# Patient Record
Sex: FEMALE | Race: WHITE | NOT HISPANIC OR LATINO | Employment: FULL TIME | URBAN - METROPOLITAN AREA
[De-identification: names, ages, dates, MRNs, and addresses within clinical notes are randomized per-mention and may not be internally consistent; named-entity substitution may affect disease eponyms.]

---

## 2023-04-28 DIAGNOSIS — E55.9 VITAMIN D DEFICIENCY: Primary | ICD-10-CM

## 2023-04-28 RX ORDER — ERGOCALCIFEROL 1.25 MG/1
50000 CAPSULE ORAL
COMMUNITY
Start: 2018-11-20 | End: 2023-04-28 | Stop reason: SDUPTHER

## 2023-04-28 RX ORDER — CLOBETASOL PROPIONATE 0.46 MG/ML
SOLUTION TOPICAL
COMMUNITY
Start: 2022-07-18 | End: 2023-10-17 | Stop reason: ALTCHOICE

## 2023-04-28 RX ORDER — KETOCONAZOLE 20 MG/ML
SHAMPOO, SUSPENSION TOPICAL
COMMUNITY
End: 2023-10-17 | Stop reason: ALTCHOICE

## 2023-04-28 RX ORDER — ATENOLOL 100 MG/1
1 TABLET ORAL NIGHTLY
COMMUNITY
Start: 2018-11-20 | End: 2023-08-07

## 2023-04-28 RX ORDER — BUPROPION HYDROCHLORIDE 150 MG/1
1 TABLET ORAL DAILY
COMMUNITY
Start: 2020-02-24 | End: 2023-10-17 | Stop reason: ALTCHOICE

## 2023-04-28 RX ORDER — TRIAMCINOLONE ACETONIDE 1 MG/G
CREAM TOPICAL
COMMUNITY
Start: 2022-08-24 | End: 2023-10-17 | Stop reason: ALTCHOICE

## 2023-04-28 RX ORDER — SCOLOPAMINE TRANSDERMAL SYSTEM 1 MG/1
1 PATCH, EXTENDED RELEASE TRANSDERMAL
COMMUNITY
Start: 2022-12-07 | End: 2023-08-08 | Stop reason: ALTCHOICE

## 2023-04-28 RX ORDER — VENLAFAXINE HYDROCHLORIDE 75 MG/1
75 CAPSULE, EXTENDED RELEASE ORAL DAILY
COMMUNITY
Start: 2023-04-11 | End: 2023-07-11 | Stop reason: SDUPTHER

## 2023-04-28 RX ORDER — LOSARTAN POTASSIUM 50 MG/1
1 TABLET ORAL DAILY
COMMUNITY
Start: 2018-11-20 | End: 2023-06-01

## 2023-04-28 RX ORDER — ANASTROZOLE 1 MG/1
1 TABLET ORAL DAILY
Qty: 7 TABLET | Refills: 51 | COMMUNITY
Start: 2022-09-06 | End: 2023-08-08 | Stop reason: ALTCHOICE

## 2023-04-28 RX ORDER — MELOXICAM 15 MG/1
1 TABLET ORAL DAILY
COMMUNITY
Start: 2019-09-16 | End: 2023-08-08 | Stop reason: SDUPTHER

## 2023-04-28 RX ORDER — OXYMETAZOLINE HYDROCHLORIDE 1 G/100G
CREAM TOPICAL
COMMUNITY
End: 2023-08-08 | Stop reason: ALTCHOICE

## 2023-05-01 RX ORDER — ERGOCALCIFEROL 1.25 MG/1
50000 CAPSULE ORAL
Qty: 4 CAPSULE | Refills: 3 | Status: SHIPPED | OUTPATIENT
Start: 2023-05-01 | End: 2023-08-08 | Stop reason: SDUPTHER

## 2023-06-01 DIAGNOSIS — I10 PRIMARY HYPERTENSION: Primary | ICD-10-CM

## 2023-06-01 RX ORDER — LOSARTAN POTASSIUM 50 MG/1
TABLET ORAL
Qty: 90 TABLET | Refills: 3 | Status: SHIPPED | OUTPATIENT
Start: 2023-06-01 | End: 2024-05-28

## 2023-07-11 DIAGNOSIS — F33.1 MAJOR DEPRESSIVE DISORDER, RECURRENT EPISODE, MODERATE (MULTI): Primary | ICD-10-CM

## 2023-07-11 RX ORDER — VENLAFAXINE HYDROCHLORIDE 75 MG/1
75 CAPSULE, EXTENDED RELEASE ORAL DAILY
Qty: 90 CAPSULE | Refills: 0 | Status: SHIPPED | OUTPATIENT
Start: 2023-07-11 | End: 2023-08-08 | Stop reason: SDUPTHER

## 2023-08-07 DIAGNOSIS — I10 PRIMARY HYPERTENSION: Primary | ICD-10-CM

## 2023-08-07 RX ORDER — ATENOLOL 100 MG/1
100 TABLET ORAL NIGHTLY
Qty: 90 TABLET | Refills: 3 | Status: SHIPPED | OUTPATIENT
Start: 2023-08-07 | End: 2023-08-08 | Stop reason: SDUPTHER

## 2023-08-08 ENCOUNTER — OFFICE VISIT (OUTPATIENT)
Dept: PRIMARY CARE | Facility: CLINIC | Age: 58
End: 2023-08-08
Payer: COMMERCIAL

## 2023-08-08 VITALS
WEIGHT: 293 LBS | DIASTOLIC BLOOD PRESSURE: 86 MMHG | SYSTOLIC BLOOD PRESSURE: 130 MMHG | HEART RATE: 60 BPM | TEMPERATURE: 98 F | BODY MASS INDEX: 53.42 KG/M2

## 2023-08-08 DIAGNOSIS — I10 PRIMARY HYPERTENSION: ICD-10-CM

## 2023-08-08 DIAGNOSIS — E55.9 VITAMIN D DEFICIENCY: ICD-10-CM

## 2023-08-08 DIAGNOSIS — M15.9 PRIMARY OSTEOARTHRITIS INVOLVING MULTIPLE JOINTS: Primary | ICD-10-CM

## 2023-08-08 DIAGNOSIS — E66.01 CLASS 3 SEVERE OBESITY DUE TO EXCESS CALORIES WITHOUT SERIOUS COMORBIDITY WITH BODY MASS INDEX (BMI) OF 50.0 TO 59.9 IN ADULT (MULTI): ICD-10-CM

## 2023-08-08 DIAGNOSIS — F33.1 MAJOR DEPRESSIVE DISORDER, RECURRENT EPISODE, MODERATE (MULTI): ICD-10-CM

## 2023-08-08 DIAGNOSIS — C50.211 MALIGNANT NEOPLASM OF UPPER-INNER QUADRANT OF RIGHT FEMALE BREAST, UNSPECIFIED ESTROGEN RECEPTOR STATUS (MULTI): ICD-10-CM

## 2023-08-08 PROBLEM — F32.5 DEPRESSION, MAJOR, IN REMISSION (CMS-HCC): Status: ACTIVE | Noted: 2023-08-08

## 2023-08-08 PROBLEM — R09.A2 GLOBUS SENSATION: Status: ACTIVE | Noted: 2023-08-08

## 2023-08-08 PROBLEM — M19.90 OSTEOARTHRITIS: Status: ACTIVE | Noted: 2023-08-08

## 2023-08-08 PROBLEM — R13.10 DYSPHAGIA: Status: ACTIVE | Noted: 2023-08-08

## 2023-08-08 PROBLEM — G47.19 EXCESSIVE DAYTIME SLEEPINESS: Status: ACTIVE | Noted: 2023-08-08

## 2023-08-08 PROBLEM — G47.00 INSOMNIA: Status: ACTIVE | Noted: 2023-08-08

## 2023-08-08 PROBLEM — N20.0 RIGHT NEPHROLITHIASIS: Status: ACTIVE | Noted: 2023-08-08

## 2023-08-08 PROBLEM — E78.5 HYPERLIPIDEMIA: Status: ACTIVE | Noted: 2023-08-08

## 2023-08-08 PROBLEM — E66.813 CLASS 3 SEVERE OBESITY DUE TO EXCESS CALORIES WITHOUT SERIOUS COMORBIDITY WITH BODY MASS INDEX (BMI) OF 50.0 TO 59.9 IN ADULT: Status: ACTIVE | Noted: 2023-08-08

## 2023-08-08 PROCEDURE — 3079F DIAST BP 80-89 MM HG: CPT | Performed by: FAMILY MEDICINE

## 2023-08-08 PROCEDURE — 3075F SYST BP GE 130 - 139MM HG: CPT | Performed by: FAMILY MEDICINE

## 2023-08-08 PROCEDURE — 99214 OFFICE O/P EST MOD 30 MIN: CPT | Performed by: FAMILY MEDICINE

## 2023-08-08 PROCEDURE — 1036F TOBACCO NON-USER: CPT | Performed by: FAMILY MEDICINE

## 2023-08-08 PROCEDURE — 3008F BODY MASS INDEX DOCD: CPT | Performed by: FAMILY MEDICINE

## 2023-08-08 RX ORDER — ATENOLOL 100 MG/1
100 TABLET ORAL NIGHTLY
Qty: 90 TABLET | Refills: 3 | Status: SHIPPED | OUTPATIENT
Start: 2023-08-08

## 2023-08-08 RX ORDER — ALENDRONATE SODIUM 70 MG/1
70 TABLET ORAL
COMMUNITY
Start: 2023-07-19 | End: 2023-11-06 | Stop reason: SDUPTHER

## 2023-08-08 RX ORDER — MELOXICAM 15 MG/1
15 TABLET ORAL DAILY
Qty: 90 TABLET | Refills: 3 | Status: SHIPPED | OUTPATIENT
Start: 2023-08-08

## 2023-08-08 RX ORDER — CALCIUM CARBONATE 300MG(750)
400 TABLET,CHEWABLE ORAL DAILY
COMMUNITY

## 2023-08-08 RX ORDER — ERGOCALCIFEROL 1.25 MG/1
50000 CAPSULE ORAL
Qty: 4 CAPSULE | Refills: 3 | Status: SHIPPED | OUTPATIENT
Start: 2023-08-08 | End: 2023-08-09 | Stop reason: SDUPTHER

## 2023-08-08 RX ORDER — VENLAFAXINE HYDROCHLORIDE 75 MG/1
75 CAPSULE, EXTENDED RELEASE ORAL DAILY
Qty: 90 CAPSULE | Refills: 0 | Status: SHIPPED | OUTPATIENT
Start: 2023-08-08 | End: 2024-01-03 | Stop reason: SDUPTHER

## 2023-08-08 RX ORDER — MELATONIN 5 MG
CAPSULE ORAL
COMMUNITY

## 2023-08-08 RX ORDER — FERROUS SULFATE, DRIED 160(50) MG
1 TABLET, EXTENDED RELEASE ORAL DAILY
COMMUNITY

## 2023-08-08 RX ORDER — TAMOXIFEN CITRATE 20 MG/1
20 TABLET ORAL DAILY
COMMUNITY
Start: 2023-07-27

## 2023-08-08 NOTE — PROGRESS NOTES
Subjective   Melissa Bardales is a 58 y.o. female who presents for Follow-up (6mon fu).    Doing well.  Has a new grandchild.  Trying to  watch her weight.  No complaints         Review of Systems   Constitutional:  Negative for fever.   Respiratory:  Negative for shortness of breath.    Cardiovascular:  Negative for chest pain.   Gastrointestinal:  Negative for nausea and vomiting.   Neurological:  Negative for dizziness and light-headedness.   All other systems reviewed and are negative.      Objective   /86   Pulse 60   Temp 36.7 °C (98 °F)   Wt (!) 150 kg (331 lb)   BMI 53.42 kg/m²     Physical Exam  HENT:      Head: Normocephalic and atraumatic.      Mouth/Throat:      Mouth: Mucous membranes are moist.      Pharynx: Oropharynx is clear.   Eyes:      Extraocular Movements: Extraocular movements intact.      Pupils: Pupils are equal, round, and reactive to light.   Cardiovascular:      Rate and Rhythm: Normal rate and regular rhythm.      Heart sounds: Normal heart sounds.   Pulmonary:      Effort: Pulmonary effort is normal.      Breath sounds: Normal breath sounds.   Musculoskeletal:         General: Normal range of motion.      Cervical back: Normal range of motion.   Lymphadenopathy:      Cervical: No cervical adenopathy.   Skin:     General: Skin is warm and dry.   Neurological:      General: No focal deficit present.      Mental Status: She is alert.   Psychiatric:         Mood and Affect: Mood normal.         Assessment/Plan   Problem List Items Addressed This Visit       Hypertension    Relevant Medications    atenolol (Tenormin) 100 mg tablet    Other Relevant Orders    Follow Up In [facility] - PCP - Health Maintenance    Malignant neoplasm of upper-inner quadrant of right female breast (CMS/HCC)    Osteoarthritis - Primary    Relevant Medications    meloxicam (Mobic) 15 mg tablet    Vitamin D deficiency    Class 3 severe obesity due to excess calories without serious comorbidity  with body mass index (BMI) of 50.0 to 59.9 in adult (CMS/[facility])     Consider NOOM  Discussed healthy diet, including eating whole, non-processed foods, increasing vegetable, fruits and whole grains. Eat small amounts throughout the day and reduce portion size.  Avoid all  calorie containing beverages and drink more water.  Exercise at least 30-60 minutes daily.  Choose something you enjoy so you can stick with it. Be sure to have a regular sleep routine.  Stick with your new lifestyle changes even if you fail.  Don't give up!             Other Visit Diagnoses       Major depressive disorder, recurrent episode, moderate (CMS/[facility])        Relevant Medications    venlafaxine XR (Effexor-XR) 75 mg 24 hr capsule              There are no Patient Instructions on file for this visit.

## 2023-08-09 DIAGNOSIS — E55.9 VITAMIN D DEFICIENCY: ICD-10-CM

## 2023-08-10 RX ORDER — ERGOCALCIFEROL 1.25 MG/1
50000 CAPSULE ORAL
Qty: 13 CAPSULE | Refills: 1 | Status: SHIPPED | OUTPATIENT
Start: 2023-08-10 | End: 2023-10-17 | Stop reason: SDUPTHER

## 2023-08-11 ASSESSMENT — ENCOUNTER SYMPTOMS
FEVER: 0
LIGHT-HEADEDNESS: 0
VOMITING: 0
SHORTNESS OF BREATH: 0
DIZZINESS: 0
NAUSEA: 0

## 2023-08-11 NOTE — ASSESSMENT & PLAN NOTE
Consider NOOM  Discussed healthy diet, including eating whole, non-processed foods, increasing vegetable, fruits and whole grains. Eat small amounts throughout the day and reduce portion size.  Avoid all  calorie containing beverages and drink more water.  Exercise at least 30-60 minutes daily.  Choose something you enjoy so you can stick with it. Be sure to have a regular sleep routine.  Stick with your new lifestyle changes even if you fail.  Don't give up!

## 2023-08-26 PROBLEM — H52.223 REGULAR ASTIGMATISM OF BOTH EYES: Status: ACTIVE | Noted: 2020-08-21

## 2023-08-26 PROBLEM — R92.8 ABNORMAL MAMMOGRAM: Status: ACTIVE | Noted: 2023-08-26

## 2023-08-26 PROBLEM — M25.562 PAIN IN BOTH KNEES: Status: ACTIVE | Noted: 2023-08-26

## 2023-08-26 PROBLEM — M25.561 PAIN IN BOTH KNEES: Status: ACTIVE | Noted: 2023-08-26

## 2023-08-26 PROBLEM — M17.0 OSTEOARTHRITIS OF BOTH KNEES: Status: ACTIVE | Noted: 2023-08-26

## 2023-08-26 PROBLEM — H60.63 CHRONIC OTITIS EXTERNA OF BOTH EARS: Status: ACTIVE | Noted: 2023-08-26

## 2023-08-26 RX ORDER — ERGOCALCIFEROL 1.25 MG/1
1.25 CAPSULE ORAL
COMMUNITY
Start: 2022-02-12 | End: 2024-04-11

## 2023-08-26 RX ORDER — ANASTROZOLE 1 MG/1
1 TABLET ORAL
COMMUNITY
Start: 2022-02-16 | End: 2023-10-17 | Stop reason: SINTOL

## 2023-08-26 RX ORDER — BUPROPION HYDROCHLORIDE 300 MG/1
300 TABLET ORAL DAILY
COMMUNITY
Start: 2022-03-25 | End: 2023-10-17 | Stop reason: ALTCHOICE

## 2023-10-02 ASSESSMENT — PATIENT HEALTH QUESTIONNAIRE - PHQ9
5. POOR APPETITE OR OVEREATING: NOT AT ALL
10. IF YOU CHECKED OFF ANY PROBLEMS, HOW DIFFICULT HAVE THESE PROBLEMS MADE IT FOR YOU TO DO YOUR WORK, TAKE CARE OF THINGS AT HOME, OR GET ALONG WITH OTHER PEOPLE: NOT DIFFICULT AT ALL
4. FEELING TIRED OR HAVING LITTLE ENERGY: SEVERAL DAYS
4. FEELING TIRED OR HAVING LITTLE ENERGY: 1
7. TROUBLE CONCENTRATING ON THINGS, SUCH AS READING THE NEWSPAPER OR WATCHING TELEVISION: 0
3. TROUBLE FALLING OR STAYING ASLEEP OR SLEEPING TOO MUCH: 0
3. TROUBLE FALLING OR STAYING ASLEEP OR SLEEPING TOO MUCH: NOT AT ALL
10. IF YOU CHECKED OFF ANY PROBLEMS, HOW DIFFICULT HAVE THESE PROBLEMS MADE IT FOR YOU TO DO YOUR WORK, TAKE CARE OF THINGS AT HOME, OR GET ALONG WITH OTHER PEOPLE: NOT DIFFICULT AT ALL
2. FEELING DOWN, DEPRESSED, IRRITABLE, OR HOPELESS: 0
4. FEELING TIRED OR HAVING LITTLE ENERGY: SEVERAL DAYS
5. POOR APPETITE OR OVEREATING: 0
6. FEELING BAD ABOUT YOURSELF - OR THAT YOU ARE A FAILURE OR HAVE LET YOURSELF OR YOUR FAMILY DOWN: NOT AT ALL
8. MOVING OR SPEAKING SO SLOWLY THAT OTHER PEOPLE COULD HAVE NOTICED. OR THE OPPOSITE, BEING SO FIGETY OR RESTLESS THAT YOU HAVE BEEN MOVING AROUND A LOT MORE THAN USUAL: 0
1. LITTLE INTEREST OR PLEASURE IN DOING THINGS: NOT AT ALL
8. MOVING OR SPEAKING SO SLOWLY THAT OTHER PEOPLE COULD HAVE NOTICED. OR THE OPPOSITE, BEING SO FIGETY OR RESTLESS THAT YOU HAVE BEEN MOVING AROUND A LOT MORE THAN USUAL: NOT AT ALL
7. TROUBLE CONCENTRATING ON THINGS, SUCH AS READING THE NEWSPAPER OR WATCHING TELEVISION: NOT AT ALL
6. FEELING BAD ABOUT YOURSELF - OR THAT YOU ARE A FAILURE OR HAVE LET YOURSELF OR YOUR FAMILY DOWN: NOT AT ALL
2. FEELING DOWN, DEPRESSED OR HOPELESS: NOT AT ALL
5. POOR APPETITE OR OVEREATING: NOT AT ALL
1. LITTLE INTEREST OR PLEASURE IN DOING THINGS: 0
8. MOVING OR SPEAKING SO SLOWLY THAT OTHER PEOPLE COULD HAVE NOTICED. OR THE OPPOSITE, BEING SO FIGETY OR RESTLESS THAT YOU HAVE BEEN MOVING AROUND A LOT MORE THAN USUAL: NOT AT ALL
9. THOUGHTS THAT YOU WOULD BE BETTER OFF DEAD, OR OF HURTING YOURSELF: NOT AT ALL
7. TROUBLE CONCENTRATING ON THINGS, SUCH AS READING THE NEWSPAPER OR WATCHING TELEVISION: NOT AT ALL
SUM OF ALL RESPONSES TO PHQ QUESTIONS 1-9: 1
9. THOUGHTS THAT YOU WOULD BE BETTER OFF DEAD, OR OF HURTING YOURSELF: NOT AT ALL
3. TROUBLE FALLING OR STAYING ASLEEP OR SLEEPING TOO MUCH: NOT AT ALL
9. THOUGHTS THAT YOU WOULD BE BETTER OFF DEAD, OR OF HURTING YOURSELF: 0
SUM OF ALL RESPONSES TO PHQ QUESTIONS 1-9: 1
1. LITTLE INTEREST OR PLEASURE IN DOING THINGS: NOT AT ALL
6. FEELING BAD ABOUT YOURSELF - OR THAT YOU ARE A FAILURE OR HAVE LET YOURSELF OR YOUR FAMILY DOWN: 0
2. FEELING DOWN, DEPRESSED, IRRITABLE, OR HOPELESS: NOT AT ALL

## 2023-10-04 ENCOUNTER — TELEPHONE (OUTPATIENT)
Dept: HEMATOLOGY/ONCOLOGY | Facility: CLINIC | Age: 58
End: 2023-10-04

## 2023-10-04 ENCOUNTER — LAB (OUTPATIENT)
Dept: LAB | Facility: CLINIC | Age: 58
End: 2023-10-04
Payer: COMMERCIAL

## 2023-10-04 ENCOUNTER — OFFICE VISIT (OUTPATIENT)
Dept: HEMATOLOGY/ONCOLOGY | Facility: CLINIC | Age: 58
End: 2023-10-04
Payer: COMMERCIAL

## 2023-10-04 ENCOUNTER — DOCUMENTATION (OUTPATIENT)
Dept: HEMATOLOGY/ONCOLOGY | Facility: CLINIC | Age: 58
End: 2023-10-04

## 2023-10-04 VITALS
HEART RATE: 69 BPM | HEIGHT: 65 IN | SYSTOLIC BLOOD PRESSURE: 134 MMHG | BODY MASS INDEX: 48.82 KG/M2 | RESPIRATION RATE: 16 BRPM | TEMPERATURE: 97.2 F | OXYGEN SATURATION: 96 % | DIASTOLIC BLOOD PRESSURE: 81 MMHG | WEIGHT: 293 LBS

## 2023-10-04 DIAGNOSIS — C50.211 MALIGNANT NEOPLASM OF UPPER-INNER QUADRANT OF RIGHT BREAST IN FEMALE, ESTROGEN RECEPTOR POSITIVE (MULTI): ICD-10-CM

## 2023-10-04 DIAGNOSIS — Z17.0 MALIGNANT NEOPLASM OF UPPER-INNER QUADRANT OF RIGHT BREAST IN FEMALE, ESTROGEN RECEPTOR POSITIVE (MULTI): ICD-10-CM

## 2023-10-04 DIAGNOSIS — C50.211 MALIGNANT NEOPLASM OF UPPER-INNER QUADRANT OF RIGHT BREAST IN FEMALE, ESTROGEN RECEPTOR POSITIVE (MULTI): Primary | ICD-10-CM

## 2023-10-04 DIAGNOSIS — Z17.0 MALIGNANT NEOPLASM OF UPPER-INNER QUADRANT OF RIGHT BREAST IN FEMALE, ESTROGEN RECEPTOR POSITIVE (MULTI): Primary | ICD-10-CM

## 2023-10-04 LAB
ALBUMIN SERPL BCP-MCNC: 3.5 G/DL (ref 3.4–5)
ALP SERPL-CCNC: 70 U/L (ref 33–110)
ALT SERPL W P-5'-P-CCNC: 87 U/L (ref 7–45)
ANION GAP SERPL CALC-SCNC: 8 MMOL/L (ref 10–20)
AST SERPL W P-5'-P-CCNC: 59 U/L (ref 9–39)
BILIRUB SERPL-MCNC: 0.5 MG/DL (ref 0–1.2)
BUN SERPL-MCNC: 15 MG/DL (ref 6–23)
CALCIUM SERPL-MCNC: 8.8 MG/DL (ref 8.6–10.3)
CHLORIDE SERPL-SCNC: 106 MMOL/L (ref 98–107)
CO2 SERPL-SCNC: 29 MMOL/L (ref 21–32)
CREAT SERPL-MCNC: 0.64 MG/DL (ref 0.5–1.05)
GFR SERPL CREATININE-BSD FRML MDRD: >90 ML/MIN/1.73M*2
GLUCOSE SERPL-MCNC: 112 MG/DL (ref 74–99)
POTASSIUM SERPL-SCNC: 4.1 MMOL/L (ref 3.5–5.3)
PROT SERPL-MCNC: 6.2 G/DL (ref 6.4–8.2)
SODIUM SERPL-SCNC: 139 MMOL/L (ref 136–145)

## 2023-10-04 PROCEDURE — 3008F BODY MASS INDEX DOCD: CPT | Performed by: INTERNAL MEDICINE

## 2023-10-04 PROCEDURE — 1036F TOBACCO NON-USER: CPT | Performed by: INTERNAL MEDICINE

## 2023-10-04 PROCEDURE — 36415 COLL VENOUS BLD VENIPUNCTURE: CPT

## 2023-10-04 PROCEDURE — 99214 OFFICE O/P EST MOD 30 MIN: CPT | Performed by: INTERNAL MEDICINE

## 2023-10-04 PROCEDURE — 3075F SYST BP GE 130 - 139MM HG: CPT | Performed by: INTERNAL MEDICINE

## 2023-10-04 PROCEDURE — 3079F DIAST BP 80-89 MM HG: CPT | Performed by: INTERNAL MEDICINE

## 2023-10-04 PROCEDURE — 80053 COMPREHEN METABOLIC PANEL: CPT

## 2023-10-04 ASSESSMENT — PAIN SCALES - GENERAL: PAINLEVEL: 0-NO PAIN

## 2023-10-04 NOTE — PROGRESS NOTES
Patient ID: Melissa Bardales is a 58 y.o. female.  Subjective    HPI  Ms. Melissa Bardales is a 57 y/o F who presents for follow-up for early breast cancer.    Since last visit, patient reports that she is tolerating the tamoxifen well with good appetite and improved energy. States she has started the alendronate. Denies any new chest pain, palpitations, cough or SOB. States she had a few days of mild nausea but denies any stool changes or other GI issues. No new abdominal pain. She also reports a 2-week history of new pain in her right thigh, however believes it is musculoskeletal. No new bone pain. No other complaints today. Patient sees Dr. Manzano with mammogram in January 2024.       PMHx: HTN, depression (ongoing for years, restarted Wellbutrin in 2020), kidney stones in 01/2021     PSHx: bilateral knee surgery, partial right mastectomy (1/20/2021)     FHx: No other specific history of bleeding, clotting or malignant disorder in the family.      Social Hx: Non-smoker, minimal EtOH use.  with 3 healthy children. Daughter is a pharmacist. Currently working.      Objective    Vitals:    10/04/23 1245   BP: 134/81   Pulse: 69   Resp: 16   Temp: 36.2 °C (97.2 °F)   SpO2: 96%     Review of Systems:   Review of Systems:    Positive per HPI, otherwise negative.     Physical Exam:      Constitutional: Patient appears in no acute distress.   Sitting comfortably in chair.   Eyes: EOMI, clear sclera   ENMT: mucous membranes moist, no apparent injury   Head/Neck: Neck supple, no JVD   Respiratory/Thorax: Patent airways, CTAB, normal  breath sounds, no increased work of breathing   Cardiovascular: Regular, rate and rhythm, no murmurs   Gastrointestinal: Nondistended, soft, non-tender,  no rebound tenderness or guarding, no masses palpable   Extremities: normal extremities, no cyanosis edema,  no swelling   Neurological: alert and oriented x3, nonfocal, normal  speech and hearing   Breast: deferred today    Lymphatic: No palpable lymphadenopathy in cervical,  axillary  lymph nodes.  Spleen appears normal size.   Psychological: Appropriate mood and behavior, normal  affect   Skin: Warm and dry, no lesions, no rashes     Performance Status:  Symptomatic; fully ambulatory    Assessment/Plan    1. Stage IA right breast IDC, [id number] (1.6 cm) ER 95,AZ 95, HER2 negative, low risk luminal type A  - Initially diagnosed after screening mammogram. Patient is now s/p right partial mastectomy.  - We discussed her final pathology, low risk luminal type A, and given no negative small tumor, will proceed with adjuvant endocrine therapy after radiation therapy.  - Discussed data on risk of recurrence, and given young age, I do recommend endocrine therapy, although she is concerned for side effects.  - Discussed risk of bone loss and advise to start calcium and vitamin D.  - Also discussed long term risk of cardiac disease and discussed importance of maintaining a healthy BMI and following up for regular age appropriate cardiovascular health such as good blood pressure control and assessing for HLD.  - Started letrozole 2.5 mg pO 3/31/2021.  - Patient switched to Arimidex (6/10/2021) due to possible dizziness from letrozole.  - No evidence of recurrence on exam today.   - Overall, she continues to tolerate Arimidex fine     3/3/23:  - Patient reports no new toxicities.   - We did discuss Zometa as an adjuvant treatment to help prevent bone loss and recurrence of breast cancer.  - She will think about it. Information provided today.   - She will continue anastrozole for now. Will continue calcium and vitamin D.  - RTC in 4 months with David, 8 months with me, and she will see Dr. Manzano in 12/2023 for her mammogram.     7/3/23:  - Patient without evidence of recurrence on today's exam  - She is tolerating Anastrazole well  - Does not want to do Zometa at this time, due for repeat DEXA  - Mammogram due in December with Dr. Manzano  -  She will return in October to see Dr. Rhonda Quiroz   - At least 20 minutes of direct consultation was spent with the patient today  - RTC in 3 months      7/19/23: Telephone call   - Reviewed bone density which shows worsening bone loss now consistent with osteopenia completed in June 2023 and after reviewing these results patient is hesitant to continue on aromatase inhibitors.   - We discussed the risk of progression and the recommendation to continue for 5 years in the postmenopausal state. She wished to discuss alternatives. We discussed tamoxifen which is thought to be inferior to aromatase inhibitors in postmenopausal that  is associated with less bone loss. She would like to try this option and we will send in prescription.   - She also is requesting alendronate. We discussed this is for osteoporosis not osteopenia but we can attempt to see if insurance will cover. We will plan for alendronate 70 mg weekly along with calcium.  - RTC as previously scheduled in 3 months.    10/4/23:  - Patient overall is feeling well.   - No major toxicities.   - She does describe some new pain in her right thigh that started 2 weeks ago and believes it is muskuloskeletal.  - She did have some elevated liver enzymes in June. We will plan to repeat a CMP today.  - We will just follow her enzymes and if still elevated we will consider further imaging with an ultrasound versus seeing her PCP.   - Given she is 2.5 years out we will stretch her follow-ups to 6 months.  - She is planned to get a mammogram and see Dr. Manzano in January 2024.   - We will see her again in July 2024.   - RTC in July 2024.      2. Depression  - On Wellbutrin for the last  year.  - Will monitor while on Letrozole.   - Doing ok.     3. Hypertension   - Management per PCP.     4. Vertigo  - Appears benign positional. Occurs daily. No recent viral or other provoking factors. Patient to try Epley maneuvers and follow up in 2 weeks.        RTC in July 2024. This  note has been transcribed using a medical scribe and there is a possibility of unintentional typing misprints.      Diagnoses and all orders for this visit:  Malignant neoplasm of upper-inner quadrant of right breast in female, estrogen receptor positive (CMS/HCC)     Rhonda Quiroz MD  Hematology/Oncology  [facility]    Scribe Attestation  By signing my name below, AMANDA TasneemAj Guoibcarri attest that this documentation has been prepared under the direction and in the presence of Rhonda Quiroz MD.

## 2023-10-04 NOTE — TELEPHONE ENCOUNTER
Per Dr. Quiroz liver enzymes still up just slight and we do recommend follow up with PCP      I told Melissa all information. She did let me know that she already reached out to her PCP regarding next steps. I encouraged her to call back if she needs anything. She was very appreciative and denied further questions

## 2023-10-17 ENCOUNTER — OFFICE VISIT (OUTPATIENT)
Dept: PRIMARY CARE | Facility: CLINIC | Age: 58
End: 2023-10-17
Payer: COMMERCIAL

## 2023-10-17 ENCOUNTER — LAB (OUTPATIENT)
Dept: LAB | Facility: LAB | Age: 58
End: 2023-10-17
Payer: COMMERCIAL

## 2023-10-17 VITALS
WEIGHT: 293 LBS | DIASTOLIC BLOOD PRESSURE: 74 MMHG | TEMPERATURE: 98.2 F | HEART RATE: 69 BPM | SYSTOLIC BLOOD PRESSURE: 144 MMHG | BODY MASS INDEX: 55.98 KG/M2

## 2023-10-17 DIAGNOSIS — G47.33 OSA (OBSTRUCTIVE SLEEP APNEA): ICD-10-CM

## 2023-10-17 DIAGNOSIS — Z00.00 WELL ADULT HEALTH CHECK: ICD-10-CM

## 2023-10-17 DIAGNOSIS — R74.8 ELEVATED LIVER ENZYMES: ICD-10-CM

## 2023-10-17 DIAGNOSIS — R74.8 ELEVATED LIVER ENZYMES: Primary | ICD-10-CM

## 2023-10-17 DIAGNOSIS — E66.01 CLASS 3 SEVERE OBESITY DUE TO EXCESS CALORIES WITHOUT SERIOUS COMORBIDITY WITH BODY MASS INDEX (BMI) OF 50.0 TO 59.9 IN ADULT (MULTI): ICD-10-CM

## 2023-10-17 PROBLEM — R09.A2 GLOBUS SENSATION: Status: RESOLVED | Noted: 2023-08-08 | Resolved: 2023-10-17

## 2023-10-17 PROBLEM — H60.63 CHRONIC OTITIS EXTERNA OF BOTH EARS: Status: RESOLVED | Noted: 2023-08-26 | Resolved: 2023-10-17

## 2023-10-17 PROBLEM — M25.562 PAIN IN BOTH KNEES: Status: RESOLVED | Noted: 2023-08-26 | Resolved: 2023-10-17

## 2023-10-17 PROBLEM — M25.561 PAIN IN BOTH KNEES: Status: RESOLVED | Noted: 2023-08-26 | Resolved: 2023-10-17

## 2023-10-17 PROCEDURE — 80061 LIPID PANEL: CPT

## 2023-10-17 PROCEDURE — 86663 EPSTEIN-BARR ANTIBODY: CPT

## 2023-10-17 PROCEDURE — 86664 EPSTEIN-BARR NUCLEAR ANTIGEN: CPT

## 2023-10-17 PROCEDURE — 3077F SYST BP >= 140 MM HG: CPT | Performed by: FAMILY MEDICINE

## 2023-10-17 PROCEDURE — 3008F BODY MASS INDEX DOCD: CPT | Performed by: FAMILY MEDICINE

## 2023-10-17 PROCEDURE — 3078F DIAST BP <80 MM HG: CPT | Performed by: FAMILY MEDICINE

## 2023-10-17 PROCEDURE — 36415 COLL VENOUS BLD VENIPUNCTURE: CPT

## 2023-10-17 PROCEDURE — 99214 OFFICE O/P EST MOD 30 MIN: CPT | Performed by: FAMILY MEDICINE

## 2023-10-17 PROCEDURE — 80074 ACUTE HEPATITIS PANEL: CPT

## 2023-10-17 PROCEDURE — 89240 UNLISTED MISC PATH TEST: CPT | Performed by: FAMILY MEDICINE

## 2023-10-17 PROCEDURE — 86665 EPSTEIN-BARR CAPSID VCA: CPT

## 2023-10-17 PROCEDURE — 1036F TOBACCO NON-USER: CPT | Performed by: FAMILY MEDICINE

## 2023-10-17 PROCEDURE — 80053 COMPREHEN METABOLIC PANEL: CPT

## 2023-10-17 NOTE — PROGRESS NOTES
Subjective   Melissa Bardales is a 58 y.o. female who presents for Follow-up (Fu to labs she had done with Endo - liver enzymes).    Has been seeing Dr. Quiroz oncology to follow up breast cancer.  Did not tolerate several of the agents.  She was changed to tamoxifen in July.    Around that same time she was found to have elevated lft's which went up further on recheck.   She denies alcohol, tylenol, no risk factors for viral hepatitis.    She denies abdominal pain.      Never really got the results from her HST in March.  Thought she heard she needed CPAP but never did it.    C/o poor sleep, snoring.  Not restful sleep, daytime fatigue.                 Review of Systems   Constitutional:  Negative for fever.   Respiratory:  Negative for shortness of breath.    Cardiovascular:  Negative for chest pain.   Gastrointestinal:  Negative for nausea and vomiting.   Neurological:  Negative for dizziness and light-headedness.   All other systems reviewed and are negative.      Objective   /74   Pulse 69   Temp 36.8 °C (98.2 °F)   Wt (!) 152 kg (336 lb)   BMI 55.98 kg/m²     Physical Exam  HENT:      Head: Normocephalic and atraumatic.      Mouth/Throat:      Mouth: Mucous membranes are moist.      Pharynx: Oropharynx is clear.   Eyes:      Extraocular Movements: Extraocular movements intact.      Pupils: Pupils are equal, round, and reactive to light.   Cardiovascular:      Rate and Rhythm: Normal rate and regular rhythm.      Heart sounds: Normal heart sounds.   Pulmonary:      Effort: Pulmonary effort is normal.      Breath sounds: Normal breath sounds.   Musculoskeletal:         General: Normal range of motion.      Cervical back: Normal range of motion.   Lymphadenopathy:      Cervical: No cervical adenopathy.   Skin:     General: Skin is warm and dry.   Neurological:      General: No focal deficit present.      Mental Status: She is alert.   Psychiatric:         Mood and Affect: Mood normal.          Assessment/Plan   Problem List Items Addressed This Visit       Class 3 severe obesity due to excess calories without serious comorbidity with body mass index (BMI) of 50.0 to 59.9 in adult (CMS/HCC)     Discussed healthy diet, including eating whole, non-processed foods, increasing vegetable, fruits and whole grains. Eat small amounts throughout the day and reduce portion size.  Avoid all  calorie containing beverages and drink more water.  Exercise at least 30-60 minutes daily.  Choose something you enjoy so you can stick with it. Be sure to have a regular sleep routine.  Stick with your new lifestyle changes even if you fail.  Don't give up!            Elevated liver enzymes - Primary    Relevant Orders    US abdomen limited liver    Hepatitis panel, acute (Completed)    Sumit-Barr virus VCA antibody panel (Completed)    Comprehensive metabolic panel (Completed)    ELIZABETH (obstructive sleep apnea)     We reviewed her HST in detail.  She does not want to go to the lab for further testing.  Would just like to try the APAP.    I ordered it for her.  We talked about sleep hygiene.           Relevant Orders    Positive Airway Pressure (PAP) Therapy     Other Visit Diagnoses       Well adult health check        Relevant Orders    Lipid Panel (Completed)              There are no Patient Instructions on file for this visit.

## 2023-10-18 LAB
ALBUMIN SERPL BCP-MCNC: 3.9 G/DL (ref 3.4–5)
ALP SERPL-CCNC: 75 U/L (ref 33–110)
ALT SERPL W P-5'-P-CCNC: 80 U/L (ref 7–45)
ANION GAP SERPL CALC-SCNC: 18 MMOL/L (ref 10–20)
AST SERPL W P-5'-P-CCNC: 82 U/L (ref 9–39)
BILIRUB SERPL-MCNC: 0.6 MG/DL (ref 0–1.2)
BUN SERPL-MCNC: 14 MG/DL (ref 6–23)
CALCIUM SERPL-MCNC: 9 MG/DL (ref 8.6–10.6)
CHLORIDE SERPL-SCNC: 104 MMOL/L (ref 98–107)
CHOLEST SERPL-MCNC: 180 MG/DL (ref 0–199)
CHOLESTEROL/HDL RATIO: 3.5
CO2 SERPL-SCNC: 23 MMOL/L (ref 21–32)
CREAT SERPL-MCNC: 0.69 MG/DL (ref 0.5–1.05)
EBV EA IGG SER QL: NEGATIVE
EBV NA AB SER QL: POSITIVE
EBV VCA IGG SER IA-ACNC: POSITIVE
EBV VCA IGM SER IA-ACNC: ABNORMAL
GFR SERPL CREATININE-BSD FRML MDRD: >90 ML/MIN/1.73M*2
GLUCOSE SERPL-MCNC: 84 MG/DL (ref 74–99)
HAV IGM SER QL: NONREACTIVE
HBV CORE IGM SER QL: NONREACTIVE
HBV SURFACE AG SERPL QL IA: NONREACTIVE
HCV AB SER QL: NONREACTIVE
HDLC SERPL-MCNC: 51.2 MG/DL
LDLC SERPL CALC-MCNC: 104 MG/DL
NON HDL CHOLESTEROL: 129 MG/DL (ref 0–149)
POTASSIUM SERPL-SCNC: 4.7 MMOL/L (ref 3.5–5.3)
PROT SERPL-MCNC: 6.8 G/DL (ref 6.4–8.2)
SODIUM SERPL-SCNC: 140 MMOL/L (ref 136–145)
TRIGL SERPL-MCNC: 125 MG/DL (ref 0–149)
VLDL: 25 MG/DL (ref 0–40)

## 2023-10-19 ENCOUNTER — TELEPHONE (OUTPATIENT)
Dept: PRIMARY CARE | Facility: CLINIC | Age: 58
End: 2023-10-19
Payer: COMMERCIAL

## 2023-10-19 PROBLEM — H52.223 REGULAR ASTIGMATISM OF BOTH EYES: Status: RESOLVED | Noted: 2020-08-21 | Resolved: 2023-10-19

## 2023-10-19 PROBLEM — E78.2 MIXED HYPERLIPIDEMIA: Status: ACTIVE | Noted: 2023-08-08

## 2023-10-19 PROBLEM — R92.8 ABNORMAL MAMMOGRAM: Status: RESOLVED | Noted: 2023-08-26 | Resolved: 2023-10-19

## 2023-10-19 PROBLEM — R13.10 DYSPHAGIA: Status: RESOLVED | Noted: 2023-08-08 | Resolved: 2023-10-19

## 2023-10-19 ASSESSMENT — ENCOUNTER SYMPTOMS
LIGHT-HEADEDNESS: 0
VOMITING: 0
DIZZINESS: 0
SHORTNESS OF BREATH: 0
NAUSEA: 0
FEVER: 0

## 2023-10-19 NOTE — TELEPHONE ENCOUNTER
She lm that she called the medical c company about her cpap  And they told her that she was not in their system  Can you call the company and see if they received order please  And refax if needed

## 2023-10-19 NOTE — ASSESSMENT & PLAN NOTE
Discussed healthy diet, including eating whole, non-processed foods, increasing vegetable, fruits and whole grains. Eat small amounts throughout the day and reduce portion size.  Avoid all  calorie containing beverages and drink more water.  Exercise at least 30-60 minutes daily.  Choose something you enjoy so you can stick with it. Be sure to have a regular sleep routine.  Stick with your new lifestyle changes even if you fail.  Don't give up!

## 2023-10-19 NOTE — ASSESSMENT & PLAN NOTE
We reviewed her HST in detail.  She does not want to go to the lab for further testing.  Would just like to try the APAP.    I ordered it for her.  We talked about sleep hygiene.

## 2023-10-20 ENCOUNTER — ANCILLARY PROCEDURE (OUTPATIENT)
Dept: RADIOLOGY | Facility: CLINIC | Age: 58
End: 2023-10-20
Payer: COMMERCIAL

## 2023-10-20 DIAGNOSIS — R74.8 ELEVATED LIVER ENZYMES: ICD-10-CM

## 2023-10-20 LAB — SCAN RESULT: NORMAL

## 2023-10-20 PROCEDURE — 76705 ECHO EXAM OF ABDOMEN: CPT

## 2023-10-20 PROCEDURE — 76705 ECHO EXAM OF ABDOMEN: CPT | Performed by: RADIOLOGY

## 2023-11-06 ENCOUNTER — TELEPHONE (OUTPATIENT)
Dept: HEMATOLOGY/ONCOLOGY | Facility: CLINIC | Age: 58
End: 2023-11-06
Payer: COMMERCIAL

## 2023-11-06 DIAGNOSIS — Z17.0 MALIGNANT NEOPLASM OF UPPER-INNER QUADRANT OF RIGHT BREAST IN FEMALE, ESTROGEN RECEPTOR POSITIVE (MULTI): ICD-10-CM

## 2023-11-06 DIAGNOSIS — C50.211 MALIGNANT NEOPLASM OF UPPER-INNER QUADRANT OF RIGHT BREAST IN FEMALE, ESTROGEN RECEPTOR POSITIVE (MULTI): ICD-10-CM

## 2023-11-06 RX ORDER — ALENDRONATE SODIUM 70 MG/1
70 TABLET ORAL
Qty: 10 TABLET | Refills: 1 | Status: SHIPPED | OUTPATIENT
Start: 2023-11-06 | End: 2023-11-08 | Stop reason: SDUPTHER

## 2023-11-08 DIAGNOSIS — C50.211 MALIGNANT NEOPLASM OF UPPER-INNER QUADRANT OF RIGHT BREAST IN FEMALE, ESTROGEN RECEPTOR POSITIVE (MULTI): ICD-10-CM

## 2023-11-08 DIAGNOSIS — Z17.0 MALIGNANT NEOPLASM OF UPPER-INNER QUADRANT OF RIGHT BREAST IN FEMALE, ESTROGEN RECEPTOR POSITIVE (MULTI): ICD-10-CM

## 2023-11-08 RX ORDER — ALENDRONATE SODIUM 70 MG/1
70 TABLET ORAL
Qty: 12 TABLET | Refills: 0 | Status: SHIPPED | OUTPATIENT
Start: 2023-11-08 | End: 2024-01-15 | Stop reason: SDUPTHER

## 2024-01-03 DIAGNOSIS — F33.1 MAJOR DEPRESSIVE DISORDER, RECURRENT EPISODE, MODERATE (MULTI): ICD-10-CM

## 2024-01-03 RX ORDER — VENLAFAXINE HYDROCHLORIDE 75 MG/1
75 CAPSULE, EXTENDED RELEASE ORAL DAILY
Qty: 90 CAPSULE | Refills: 2 | Status: SHIPPED | OUTPATIENT
Start: 2024-01-03

## 2024-01-03 NOTE — TELEPHONE ENCOUNTER
Pt called for refill   Last OV 10/17/2023  Future appointment 02/13/2024   Routing refill request to provider for review/approval because:  Patient needs to be seen because it has been more than 1 year since last office visit.

## 2024-01-04 ENCOUNTER — HOSPITAL ENCOUNTER (OUTPATIENT)
Dept: RADIOLOGY | Facility: HOSPITAL | Age: 59
Discharge: HOME | End: 2024-01-04
Payer: COMMERCIAL

## 2024-01-04 ENCOUNTER — OFFICE VISIT (OUTPATIENT)
Dept: SURGICAL ONCOLOGY | Facility: HOSPITAL | Age: 59
End: 2024-01-04
Payer: COMMERCIAL

## 2024-01-04 VITALS
WEIGHT: 293 LBS | RESPIRATION RATE: 18 BRPM | DIASTOLIC BLOOD PRESSURE: 82 MMHG | SYSTOLIC BLOOD PRESSURE: 144 MMHG | HEIGHT: 66 IN | BODY MASS INDEX: 47.09 KG/M2 | HEART RATE: 62 BPM

## 2024-01-04 DIAGNOSIS — C50.211 MALIGNANT NEOPLASM OF UPPER-INNER QUADRANT OF RIGHT FEMALE BREAST (MULTI): ICD-10-CM

## 2024-01-04 DIAGNOSIS — Z17.0 MALIGNANT NEOPLASM OF UPPER-INNER QUADRANT OF RIGHT BREAST IN FEMALE, ESTROGEN RECEPTOR POSITIVE (MULTI): Primary | ICD-10-CM

## 2024-01-04 DIAGNOSIS — Z17.0 MALIGNANT NEOPLASM OF UPPER-INNER QUADRANT OF RIGHT BREAST IN FEMALE, ESTROGEN RECEPTOR POSITIVE (MULTI): ICD-10-CM

## 2024-01-04 DIAGNOSIS — C50.211 MALIGNANT NEOPLASM OF UPPER-INNER QUADRANT OF RIGHT BREAST IN FEMALE, ESTROGEN RECEPTOR POSITIVE (MULTI): ICD-10-CM

## 2024-01-04 DIAGNOSIS — C50.211 MALIGNANT NEOPLASM OF UPPER-INNER QUADRANT OF RIGHT BREAST IN FEMALE, ESTROGEN RECEPTOR POSITIVE (MULTI): Primary | ICD-10-CM

## 2024-01-04 PROCEDURE — 99213 OFFICE O/P EST LOW 20 MIN: CPT | Performed by: SURGERY

## 2024-01-04 PROCEDURE — 77062 BREAST TOMOSYNTHESIS BI: CPT

## 2024-01-04 PROCEDURE — 3079F DIAST BP 80-89 MM HG: CPT | Performed by: SURGERY

## 2024-01-04 PROCEDURE — 3077F SYST BP >= 140 MM HG: CPT | Performed by: SURGERY

## 2024-01-04 PROCEDURE — 1036F TOBACCO NON-USER: CPT | Performed by: SURGERY

## 2024-01-04 PROCEDURE — 77062 BREAST TOMOSYNTHESIS BI: CPT | Performed by: RADIOLOGY

## 2024-01-04 PROCEDURE — 77066 DX MAMMO INCL CAD BI: CPT | Performed by: RADIOLOGY

## 2024-01-04 PROCEDURE — 3008F BODY MASS INDEX DOCD: CPT | Performed by: SURGERY

## 2024-01-04 NOTE — PROGRESS NOTES
BREAST SURGICAL ONCOLOGY FOLLOW UP     Assessment/Plan     1. Stage IA Right breast cancer 1/2021, pT1cN0, grade 2 IDC, ER+95% TN+95% HER2-.   - s/p R.mspm / SLNB (0/4) on 1/20/2021 with a 1.6cm mass and negative margins.   - Mammaprint was low risk luminal type A   - She had WBRT 3/10/2021 - 4/21/2021   - Started Letrozole 5/2021. Now on arimidex.     2. Scattered breast density     3. HTN, arthritis, obesity     Clinical breast exam and mammogram today are normal.  There is no evidence of local recurrence.    Continue follow up with medical oncology as scheduled.    Will follow up in the breast center with my nurse practitioner partner in 1 year at the time of mammogram or sooner if there are any breast concerns.  I will see Melissa  on an as needed basis for any concerns.    Subjective   Melissa Bardales is a 58 y.o. female presents today for follow up carcinoma of the right breast.     Treatment history  She has Stage IA Right breast cancer, T1cN0, grade 2 IDC, ER+95% TN+95% HER2-.  She is s/p R.mspm / SLNB (0/4) on 1/20/2021 with a 1.6cm gr2 IDC, margins negative.  Mammaprint was low risk luminal type A  She had WBRT 3/10/2021 - 4/21/2021  She started Letrozole 5/2021 and changed to arimidex 6/10/2021. Now on tamoxifen in addition to Effexor and tart cherry.       Bilateral mammogram today: BIRADS 2, benign.    Review of Systems   Constitutional symptoms: Denies generalized fatigue.  Denies weight change, fevers/chills, difficulty sleeping   Eyes: Denies double vision, glaucoma, cataracts.  Ear/nose/throat/mouth: Denies hearing changes, sore throat, sinus problems.  Cardiovascular: No chest pain.  Denies irregular heartbeat.  Denies ankle swelling.  Respiratory: No wheezing, cough, or shortness of breath.  Gastrointestinal: No abdominal pain,  No nausea/vomiting.  No indigestion/heartburn.  No change in bowel habits.  No constipation or diarrhea.   Genitourinary: No urinary incontinence.  No urinary frequency.   No painful urination.  Musculoskeletal: No bone pain, no muscle pain, no joint pain.   Integumentary: No rash. No masses.  No changes in moles.  No easy bruising.  Neurological: No headaches.  No tremors. No numbness/tingling.  Psychiatric: No anxiety. No depression.  Endocrine: No excessive thirst.  Not too hot or too cold.  Not tired or fatigued.    Hematological/lymphatic: No swollen glands or blood clotting problems.  No bruising.    Objective   Physical Exam  General: Alert and oriented x 3.  Mood and affect are appropriate.  HEENT: EOMI, PERRLA.   Neck: supple, no masses, no cervical adenopathy.  Cardiovascular: no lower extremity edema.  Pulmonary: breathing non labored on room air.  GI: Abdomen soft, no masses. No hepatomegaly or splenomegaly.  Lymph nodes: No supraclavicular or axillary adenopathy bilaterally. Well healed right axillary incision.  Musculoskeletal: Full range of motion in the upper extremities bilaterally.  Neuro: denies dizziness, tremors    Breasts: The breasts were examined in both the seated and supine positions.    RIGHT: The nipple is everted without nipple discharge.  There are no skin changes, skin thickening, or dimpling. There are no masses palpated in the RIGHT breast.  Healed periareolar incision.  Right breast smaller than left secondary to radiation.  LEFT: The nipple is everted without nipple discharge.  There are no skin changes, skin thickening, or dimpling. There are no masses palpated in the LEFT breast.       Radiology review: All images and reports were personally reviewed.         Beba Manzano DO

## 2024-01-15 ENCOUNTER — TELEPHONE (OUTPATIENT)
Dept: HEMATOLOGY/ONCOLOGY | Facility: CLINIC | Age: 59
End: 2024-01-15
Payer: COMMERCIAL

## 2024-01-15 DIAGNOSIS — C50.211 MALIGNANT NEOPLASM OF UPPER-INNER QUADRANT OF RIGHT BREAST IN FEMALE, ESTROGEN RECEPTOR POSITIVE (MULTI): ICD-10-CM

## 2024-01-15 DIAGNOSIS — Z17.0 MALIGNANT NEOPLASM OF UPPER-INNER QUADRANT OF RIGHT BREAST IN FEMALE, ESTROGEN RECEPTOR POSITIVE (MULTI): ICD-10-CM

## 2024-01-15 RX ORDER — ALENDRONATE SODIUM 70 MG/1
70 TABLET ORAL
Qty: 12 TABLET | Refills: 0 | Status: SHIPPED | OUTPATIENT
Start: 2024-01-15 | End: 2024-04-15 | Stop reason: SDUPTHER

## 2024-02-06 ASSESSMENT — PROMIS GLOBAL HEALTH SCALE
RATE_AVERAGE_PAIN: 0
RATE_AVERAGE_FATIGUE: MILD
RATE_MENTAL_HEALTH: VERY GOOD
RATE_GENERAL_HEALTH: VERY GOOD
CARRYOUT_PHYSICAL_ACTIVITIES: MOSTLY
EMOTIONAL_PROBLEMS: RARELY
RATE_SOCIAL_SATISFACTION: VERY GOOD
CARRYOUT_SOCIAL_ACTIVITIES: VERY GOOD
RATE_QUALITY_OF_LIFE: GOOD
RATE_PHYSICAL_HEALTH: GOOD

## 2024-02-13 ENCOUNTER — OFFICE VISIT (OUTPATIENT)
Dept: PRIMARY CARE | Facility: CLINIC | Age: 59
End: 2024-02-13
Payer: COMMERCIAL

## 2024-02-13 VITALS
HEART RATE: 63 BPM | TEMPERATURE: 98 F | HEIGHT: 66 IN | DIASTOLIC BLOOD PRESSURE: 77 MMHG | WEIGHT: 293 LBS | BODY MASS INDEX: 47.09 KG/M2 | SYSTOLIC BLOOD PRESSURE: 118 MMHG

## 2024-02-13 DIAGNOSIS — Z00.00 WELL ADULT HEALTH CHECK: ICD-10-CM

## 2024-02-13 DIAGNOSIS — E66.01 CLASS 3 SEVERE OBESITY DUE TO EXCESS CALORIES WITHOUT SERIOUS COMORBIDITY WITH BODY MASS INDEX (BMI) OF 50.0 TO 59.9 IN ADULT (MULTI): ICD-10-CM

## 2024-02-13 DIAGNOSIS — M85.89 OSTEOPENIA OF MULTIPLE SITES: ICD-10-CM

## 2024-02-13 DIAGNOSIS — E88.819 INSULIN RESISTANCE: ICD-10-CM

## 2024-02-13 DIAGNOSIS — I10 PRIMARY HYPERTENSION: ICD-10-CM

## 2024-02-13 DIAGNOSIS — G47.33 OSA (OBSTRUCTIVE SLEEP APNEA): Primary | ICD-10-CM

## 2024-02-13 PROCEDURE — 3074F SYST BP LT 130 MM HG: CPT | Performed by: FAMILY MEDICINE

## 2024-02-13 PROCEDURE — 3078F DIAST BP <80 MM HG: CPT | Performed by: FAMILY MEDICINE

## 2024-02-13 PROCEDURE — 99396 PREV VISIT EST AGE 40-64: CPT | Performed by: FAMILY MEDICINE

## 2024-02-13 PROCEDURE — 3008F BODY MASS INDEX DOCD: CPT | Performed by: FAMILY MEDICINE

## 2024-02-13 PROCEDURE — 99213 OFFICE O/P EST LOW 20 MIN: CPT | Performed by: FAMILY MEDICINE

## 2024-02-13 PROCEDURE — 1036F TOBACCO NON-USER: CPT | Performed by: FAMILY MEDICINE

## 2024-02-13 ASSESSMENT — ENCOUNTER SYMPTOMS
NAUSEA: 0
LIGHT-HEADEDNESS: 0
FEVER: 0
SHORTNESS OF BREATH: 0
DIZZINESS: 0
VOMITING: 0

## 2024-02-13 ASSESSMENT — PATIENT HEALTH QUESTIONNAIRE - PHQ9
SUM OF ALL RESPONSES TO PHQ9 QUESTIONS 1 AND 2: 0
1. LITTLE INTEREST OR PLEASURE IN DOING THINGS: NOT AT ALL
2. FEELING DOWN, DEPRESSED OR HOPELESS: NOT AT ALL

## 2024-02-13 NOTE — ASSESSMENT & PLAN NOTE
Good control on current meds.  Will defer changing regimen to a later date but would consider tapering off of atenolol and increasing losartan and perhaps adding another agent.  Will revisit at next appointment

## 2024-02-13 NOTE — ASSESSMENT & PLAN NOTE
She would benefit greatly from a GLP-1.  I have prescribed Ozempic for now we discussed proper use and benefits.  In the event we have a problem with coverage I have consulted pharmacy for assistance.  If we still cannot get drug in this class covered she would consider getting semaglutide from a compounding pharmacy

## 2024-02-13 NOTE — ASSESSMENT & PLAN NOTE
She brought her machine in here.  We reviewed settings, usage, etc.  She is doing great and feeling much better.  Less tired during the day.

## 2024-02-13 NOTE — PROGRESS NOTES
"Subjective   Melissa Bardales is a 58 y.o. female who presents for Annual Exam.    Here for recheck and annual exam.  Recently started on PAP therapy for ELIZABETH.  She is doing great!  She says initially it was hard to get used to but she is using it nightly gets a good seal with her mask and feels very good in the morning.  Much less daytime sleepiness.  Feeling better in general    Wants to talk about weight loss meds interested in starting a GLP-1.         Review of Systems   Constitutional:  Negative for fever.   Respiratory:  Negative for shortness of breath.    Cardiovascular:  Negative for chest pain.   Gastrointestinal:  Negative for nausea and vomiting.   Neurological:  Negative for dizziness and light-headedness.   All other systems reviewed and are negative.      Objective   /77   Pulse 63   Temp 36.7 °C (98 °F)   Ht 1.676 m (5' 6\")   Wt (!) 153 kg (336 lb 8 oz)   BMI 54.31 kg/m²     Physical Exam  HENT:      Head: Normocephalic and atraumatic.      Mouth/Throat:      Mouth: Mucous membranes are moist.      Pharynx: Oropharynx is clear.   Eyes:      Extraocular Movements: Extraocular movements intact.      Pupils: Pupils are equal, round, and reactive to light.   Cardiovascular:      Rate and Rhythm: Normal rate and regular rhythm.      Heart sounds: Normal heart sounds.   Pulmonary:      Effort: Pulmonary effort is normal.      Breath sounds: Normal breath sounds.   Musculoskeletal:         General: Normal range of motion.      Cervical back: Normal range of motion.   Lymphadenopathy:      Cervical: No cervical adenopathy.   Skin:     General: Skin is warm and dry.   Neurological:      General: No focal deficit present.      Mental Status: She is alert.   Psychiatric:         Mood and Affect: Mood normal.         Assessment/Plan   Problem List Items Addressed This Visit       Primary hypertension     Good control on current meds.  Will defer changing regimen to a later date but would consider " tapering off of atenolol and increasing losartan and perhaps adding another agent.  Will revisit at next appointment         Class 3 severe obesity due to excess calories without serious comorbidity with body mass index (BMI) of 50.0 to 59.9 in adult (CMS/[facility])    ELIZABETH (obstructive sleep apnea) - Primary     She brought her machine in here.  We reviewed settings, usage, etc.  She is doing great and feeling much better.  Less tired during the day.           Well adult health check    Osteopenia of multiple sites     Started fosamax due to concerns over development of osteoporosis due to letrozole.  At the same time she was switched to tamoxifen.    Advised to add calcium.  Takes vitamin D monthly           Insulin resistance     She would benefit greatly from a GLP-1.  I have prescribed Ozempic for now we discussed proper use and benefits.  In the event we have a problem with coverage I have consulted pharmacy for assistance.  If we still cannot get drug in this class covered she would consider getting semaglutide from a compounding pharmacy         Relevant Medications    semaglutide 0.25 mg or 0.5 mg (2 mg/3 mL) pen injector    Other Relevant Orders    Follow Up In [facility] - Pharmacy         There are no Patient Instructions on file for this visit.

## 2024-02-13 NOTE — ASSESSMENT & PLAN NOTE
Started fosamax due to concerns over development of osteoporosis due to letrozole.  At the same time she was switched to tamoxifen.    Advised to add calcium.  Takes vitamin D monthly

## 2024-02-22 ENCOUNTER — TELEMEDICINE (OUTPATIENT)
Dept: PHARMACY | Facility: HOSPITAL | Age: 59
End: 2024-02-22
Payer: COMMERCIAL

## 2024-02-22 DIAGNOSIS — E88.819 INSULIN RESISTANCE: ICD-10-CM

## 2024-02-22 NOTE — PROGRESS NOTES
"      Patient ID: Melissa Bardales is a 58 y.o. female who presents for Obesity.    Referring Provider: Sonya Martinez, *  PCP: Sonya Martinez MD    Pt is here for First appointment. Last visit with PCP: 2.13.24    Verbal consent to manage patient's drug therapy was obtained from patient. They were informed they may decline to participate or withdraw from participation in pharmacy services at any time. Patient is  agreeable to detailed voice messages if unable to be reached.     Who are the patient's medications managed by? PCP        Subjective     HPI  OBESITY   Recently prescribed Ozempic by PCP  Coverage? Ozempic not covered  Past approaches to weight loss:  Best luck - tracking food - few years ago lost 40 pounds    Currently or ever seen nutrition/dietician?: No; will consider for future  Current exercise: not assessed  Current diet: not assessed    Other relevant history:  Most recent  , HDL 51.2  Renal function okay, LFTs elevated d/t EBV infection  On anastrozole for breast cancer      Review of Systems    Objective     There were no vitals taken for this visit.     Labs  Lab Results   Component Value Date    BILITOT 0.6 10/17/2023    CALCIUM 9.0 10/17/2023    CO2 23 10/17/2023     10/17/2023    CREATININE 0.69 10/17/2023    GLUCOSE 84 10/17/2023    ALKPHOS 75 10/17/2023    K 4.7 10/17/2023    PROT 6.8 10/17/2023     10/17/2023    AST 82 (H) 10/17/2023    ALT 80 (H) 10/17/2023    BUN 14 10/17/2023    ANIONGAP 18 10/17/2023    ALBUMIN 3.9 10/17/2023    LIPASE 22 01/23/2021    GFRF >90 07/03/2023     Lab Results   Component Value Date    TRIG 125 10/17/2023    CHOL 180 10/17/2023    LDLCALC 104 (H) 10/17/2023    HDL 51.2 10/17/2023     No results found for: \"HGBA1C\"    Current Outpatient Medications   Medication Instructions    alendronate (FOSAMAX) 70 mg, oral, Every 7 days    atenolol (TENORMIN) 100 mg, oral, Nightly    C/sourcherry/celery/grape seed (TART CHERRY ORAL) " 1,200 mg, oral, Daily    calcium carbonate-vitamin D3 500 mg-5 mcg (200 unit) tablet 1 tablet, oral, Daily    ergocalciferol (VITAMIN D-2) 1.25 mcg, oral, Every 30 days    losartan (Cozaar) 50 mg tablet TAKE 1 TABLET DAILY    magnesium oxide (MAG-OX) 400 mg, Daily    melatonin 5 mg capsule oral    meloxicam (MOBIC) 15 mg, oral, Daily    semaglutide 0.25 mg, subcutaneous, Weekly, Increase to 0.5 mg weekly after 2 weeks if you are tolerating it.    tamoxifen (NOLVADEX) 20 mg, oral, Daily    venlafaxine XR (EFFEXOR-XR) 75 mg, oral, Daily        Assessment/Plan   Problem List Items Addressed This Visit             ICD-10-CM    Insulin resistance E88.819     Patient referred for Ozempic coverage. Will not be covered without DM diagnosis.   Will plan to run test claims for Wegovy or Zepbound when dispensing system running again. These may be covered for obesity but copays not known.   If not covered, patient will pursue semaglutide from compounding pharmacy.     Patient was encouraged to prioritize protein and healthy fats at each meal while maintaining adequate fiber (from fruits and vegetables) and water intake to ensure muscle maintenance. Encouraged to see nutritionist/dietician as well for more individualized guidance.     Will contact patient with insurance updates.             Discussed filling medications with [facility].  Ease of communication between providers and pharmacy  Quicker handling of insurance troubleshooting  Ability to autorefill medications  Delivery options to home      Labs ordered:  none     Referrals:  none     Follow-up: pending insurance coverage review     Time spent with pt: Total length of time 10 (minutes) of the encounter and more than 50% was spent counseling the patient.      Vicki Phillips, PharmD, DASHAWN  Clinical Pharmacist  Pharmacy Services  [phone]    Continue all meds under the continuation of care with the referring provider and clinical pharmacy team.    Verbal  consent to manage patient's drug therapy was obtained from the patient. They were informed they may decline to participate or withdraw from participation in pharmacy services at any time.

## 2024-02-22 NOTE — ASSESSMENT & PLAN NOTE
Patient referred for Ozempic coverage. Will not be covered without DM diagnosis.   Will plan to run test claims for Wegovy or Zepbound when dispensing system running again. These may be covered for obesity but copays not known.   If not covered, patient will pursue semaglutide from compounding pharmacy.     Patient was encouraged to prioritize protein and healthy fats at each meal while maintaining adequate fiber (from fruits and vegetables) and water intake to ensure muscle maintenance. Encouraged to see nutritionist/dietician as well for more individualized guidance.     Will contact patient with insurance updates.

## 2024-04-10 DIAGNOSIS — E55.9 VITAMIN D DEFICIENCY: Primary | ICD-10-CM

## 2024-04-10 NOTE — TELEPHONE ENCOUNTER
This came from mailorder  I dont see that you have ever filled this since we have been on Epic.  And its the high dose and usually not on it for regular use after 12wk.

## 2024-04-11 RX ORDER — ERGOCALCIFEROL 1.25 MG/1
CAPSULE ORAL
Qty: 12 CAPSULE | Refills: 0 | Status: SHIPPED | OUTPATIENT
Start: 2024-04-11

## 2024-04-15 ENCOUNTER — TELEPHONE (OUTPATIENT)
Dept: HEMATOLOGY/ONCOLOGY | Facility: CLINIC | Age: 59
End: 2024-04-15
Payer: COMMERCIAL

## 2024-04-15 DIAGNOSIS — Z17.0 MALIGNANT NEOPLASM OF UPPER-INNER QUADRANT OF RIGHT BREAST IN FEMALE, ESTROGEN RECEPTOR POSITIVE (MULTI): ICD-10-CM

## 2024-04-15 DIAGNOSIS — C50.211 MALIGNANT NEOPLASM OF UPPER-INNER QUADRANT OF RIGHT BREAST IN FEMALE, ESTROGEN RECEPTOR POSITIVE (MULTI): ICD-10-CM

## 2024-04-15 RX ORDER — ALENDRONATE SODIUM 70 MG/1
70 TABLET ORAL
Qty: 12 TABLET | Refills: 0 | Status: SHIPPED | OUTPATIENT
Start: 2024-04-15 | End: 2024-07-14

## 2024-04-23 ENCOUNTER — OFFICE VISIT (OUTPATIENT)
Dept: PRIMARY CARE | Facility: CLINIC | Age: 59
End: 2024-04-23
Payer: COMMERCIAL

## 2024-04-23 VITALS
DIASTOLIC BLOOD PRESSURE: 86 MMHG | HEIGHT: 66 IN | HEART RATE: 86 BPM | TEMPERATURE: 97.1 F | BODY MASS INDEX: 47.09 KG/M2 | WEIGHT: 293 LBS | OXYGEN SATURATION: 98 % | RESPIRATION RATE: 20 BRPM | SYSTOLIC BLOOD PRESSURE: 138 MMHG

## 2024-04-23 DIAGNOSIS — E66.01 CLASS 3 SEVERE OBESITY DUE TO EXCESS CALORIES WITHOUT SERIOUS COMORBIDITY WITH BODY MASS INDEX (BMI) OF 50.0 TO 59.9 IN ADULT (MULTI): ICD-10-CM

## 2024-04-23 DIAGNOSIS — K59.03 DRUG-INDUCED CONSTIPATION: ICD-10-CM

## 2024-04-23 DIAGNOSIS — S39.012A STRAIN OF LUMBAR REGION, INITIAL ENCOUNTER: Primary | ICD-10-CM

## 2024-04-23 PROCEDURE — 3075F SYST BP GE 130 - 139MM HG: CPT | Performed by: FAMILY MEDICINE

## 2024-04-23 PROCEDURE — 3079F DIAST BP 80-89 MM HG: CPT | Performed by: FAMILY MEDICINE

## 2024-04-23 PROCEDURE — 3008F BODY MASS INDEX DOCD: CPT | Performed by: FAMILY MEDICINE

## 2024-04-23 PROCEDURE — 99214 OFFICE O/P EST MOD 30 MIN: CPT | Performed by: FAMILY MEDICINE

## 2024-04-23 RX ORDER — MINOCYCLINE 15 MG/G
AEROSOL, FOAM TOPICAL DAILY
COMMUNITY

## 2024-04-23 RX ORDER — CYCLOBENZAPRINE HCL 5 MG
5 TABLET ORAL NIGHTLY PRN
Qty: 30 TABLET | Refills: 0 | Status: SHIPPED | OUTPATIENT
Start: 2024-04-23 | End: 2024-04-24 | Stop reason: SDUPTHER

## 2024-04-23 ASSESSMENT — ENCOUNTER SYMPTOMS: BACK PAIN: 1

## 2024-04-23 NOTE — PROGRESS NOTES
"Subjective   Melissa Bardales is a 59 y.o. female who presents for Follow-up (2 month ) and Back Pain.    Started on semaglutide at the compounding pharmacy.      Tolerating it well.  Having some constipation.  Has lost 12# so far and is very happy.  It is helping stop the food noise and cravings.    She has also been keeping a food diary.     She initially was on the 0.3 x 4 weeks, then 0.6 x 4 weeks.  Would like to go to the next higher dose.      C/o right sided lumbar pain over the last weeks or so.  Feels like she strained a muscle.    No radicular symptoms.      Back Pain  Pertinent negatives include no chest pain or fever.        Review of Systems   Constitutional:  Negative for fever.   Respiratory:  Negative for shortness of breath.    Cardiovascular:  Negative for chest pain.   Gastrointestinal:  Positive for constipation. Negative for nausea and vomiting.   Musculoskeletal:  Positive for back pain.   Neurological:  Negative for dizziness and light-headedness.   All other systems reviewed and are negative.      Objective   /86   Pulse 86   Temp 36.2 °C (97.1 °F)   Resp 20   Ht 1.676 m (5' 6\")   Wt 148 kg (325 lb 3.2 oz)   SpO2 98%   BMI 52.49 kg/m²     Physical Exam  HENT:      Head: Normocephalic and atraumatic.      Mouth/Throat:      Mouth: Mucous membranes are moist.      Pharynx: Oropharynx is clear.   Eyes:      Extraocular Movements: Extraocular movements intact.      Pupils: Pupils are equal, round, and reactive to light.   Cardiovascular:      Rate and Rhythm: Normal rate and regular rhythm.      Heart sounds: Normal heart sounds.   Pulmonary:      Effort: Pulmonary effort is normal.      Breath sounds: Normal breath sounds.   Musculoskeletal:         General: Normal range of motion.      Cervical back: Normal range of motion.      Comments: Tender spasm in the area of the right SI joint    Lymphadenopathy:      Cervical: No cervical adenopathy.   Skin:     General: Skin is warm and " dry.   Neurological:      General: No focal deficit present.      Mental Status: She is alert.   Psychiatric:         Mood and Affect: Mood normal.         Assessment/Plan   Problem List Items Addressed This Visit       Class 3 severe obesity due to excess calories without serious comorbidity with body mass index (BMI) of 50.0 to 59.9 in adult (Multi)     Will increase to the 1.2 dose of semaglutide at the compounding pharmacy.  Fax sent.  She is doing well.           Relevant Orders    Follow Up In [facility] - PCP - Established    Strain of lumbar region - Primary     Stretching heat and rest.  Call if symptoms increase         Drug-induced constipation     Drink plenty of water daily.  Eats fresh or frozen fruits and vegetables throughout the day.  Whole grains in moderation.   Avoid processed foods.  Get plenty of exercise daily.  Add fiber supplement daily and colace (ducosate sodium) daily if needed.   If no BM in 2-3 daily take Miralax.  It is ok to take miralax daily if you need to.                  There are no Patient Instructions on file for this visit.

## 2024-04-24 DIAGNOSIS — S39.012A STRAIN OF LUMBAR REGION, INITIAL ENCOUNTER: ICD-10-CM

## 2024-04-24 RX ORDER — CYCLOBENZAPRINE HCL 5 MG
5 TABLET ORAL NIGHTLY PRN
Qty: 30 TABLET | Refills: 0 | Status: SHIPPED | OUTPATIENT
Start: 2024-04-24 | End: 2024-06-23

## 2024-04-24 NOTE — TELEPHONE ENCOUNTER
Can you please send Flexeril medication to local pharm. It was sent to the wrong pharmacy.     [facility], [place] . Address Correct

## 2024-04-24 NOTE — TELEPHONE ENCOUNTER
FLEXERIL WAS CALLED INTO MAIL ORDER 4/23/24 AND PT WANTED IT SENT TO LOCAL  PLEASE CALL INTO [facility] MOVES IT.     SHE ALSO NEEDS THE RX TO THE MAIL ORDER CANCELED.  I INFORMED THE PT THAT DR IS OUT TODAY AND SHE REQUESTS TO SEND IT TO A DIFFERENT DR SO IT CAN GET DONE TODAY  PLEASE CALL PT WHEN COMPLETE

## 2024-04-25 ENCOUNTER — TELEPHONE (OUTPATIENT)
Dept: PRIMARY CARE | Facility: CLINIC | Age: 59
End: 2024-04-25
Payer: COMMERCIAL

## 2024-04-25 PROBLEM — K59.03 DRUG-INDUCED CONSTIPATION: Status: ACTIVE | Noted: 2024-04-25

## 2024-04-25 ASSESSMENT — ENCOUNTER SYMPTOMS
VOMITING: 0
SHORTNESS OF BREATH: 0
DIZZINESS: 0
NAUSEA: 0
CONSTIPATION: 1
LIGHT-HEADEDNESS: 0
FEVER: 0

## 2024-04-25 NOTE — TELEPHONE ENCOUNTER
Pt called and stated that the medication Semaglutide was wrongly sent or faxed with dose to [facility]. Pt stated she is supposed to take 0.6mg and not 1.0mg.      Looking at the faxed papers it says it was an error marked for 0.6mg and says the correct dose is 1.2mg. I am not sure where pt got 1.0mg from.    Please advice.

## 2024-04-25 NOTE — ASSESSMENT & PLAN NOTE
Drink plenty of water daily.  Eats fresh or frozen fruits and vegetables throughout the day.  Whole grains in moderation.   Avoid processed foods.  Get plenty of exercise daily.  Add fiber supplement daily and colace (ducosate sodium) daily if needed.   If no BM in 2-3 daily take Miralax.  It is ok to take miralax daily if you need to.

## 2024-04-25 NOTE — ASSESSMENT & PLAN NOTE
Will increase to the 1.2 dose of semaglutide at the compounding pharmacy.  Fax sent.  She is doing well.

## 2024-05-27 DIAGNOSIS — I10 PRIMARY HYPERTENSION: ICD-10-CM

## 2024-05-28 RX ORDER — LOSARTAN POTASSIUM 50 MG/1
TABLET ORAL
Qty: 90 TABLET | Refills: 3 | Status: SHIPPED | OUTPATIENT
Start: 2024-05-28

## 2024-05-28 NOTE — TELEPHONE ENCOUNTER
Rx Refill Request Telephone Encounter    Name:  Melissa Bardales  :  [id number]  Medication Name:  Losartan 50mg  Specific Pharmacy location:  [facility]  Date of last appointment:  2024  Date of next appointment:  2024

## 2024-06-24 ENCOUNTER — TELEPHONE (OUTPATIENT)
Dept: HEMATOLOGY/ONCOLOGY | Facility: CLINIC | Age: 59
End: 2024-06-24
Payer: COMMERCIAL

## 2024-06-24 DIAGNOSIS — Z17.0 MALIGNANT NEOPLASM OF UPPER-INNER QUADRANT OF RIGHT BREAST IN FEMALE, ESTROGEN RECEPTOR POSITIVE (MULTI): Primary | ICD-10-CM

## 2024-06-24 DIAGNOSIS — C50.211 MALIGNANT NEOPLASM OF UPPER-INNER QUADRANT OF RIGHT BREAST IN FEMALE, ESTROGEN RECEPTOR POSITIVE (MULTI): Primary | ICD-10-CM

## 2024-06-24 RX ORDER — ALENDRONATE SODIUM 70 MG/1
70 TABLET ORAL
Qty: 12 TABLET | Refills: 2 | Status: SHIPPED | OUTPATIENT
Start: 2024-06-24

## 2024-06-24 RX ORDER — TAMOXIFEN CITRATE 20 MG/1
20 TABLET ORAL DAILY
Qty: 90 TABLET | Refills: 2 | Status: SHIPPED | OUTPATIENT
Start: 2024-06-24

## 2024-06-24 NOTE — TELEPHONE ENCOUNTER
Left detailed message on name specific vice mail that refills were being sen tin today and patient will needs to get lab work drawn 1-2 days before her FUV with Dr. Quiroz in July. Encouraged to call office back with any further questions or concerns,

## 2024-06-24 NOTE — TELEPHONE ENCOUNTER
VM  Refills needed on:    Tamoxifen    Alendronate    Also, patient has questions about labs prior to visit with Dr. Quiroz in July.

## 2024-07-09 ENCOUNTER — APPOINTMENT (OUTPATIENT)
Dept: HEMATOLOGY/ONCOLOGY | Facility: CLINIC | Age: 59
End: 2024-07-09
Payer: COMMERCIAL

## 2024-07-11 ENCOUNTER — LAB (OUTPATIENT)
Dept: LAB | Facility: LAB | Age: 59
End: 2024-07-11
Payer: COMMERCIAL

## 2024-07-11 DIAGNOSIS — C50.211 MALIGNANT NEOPLASM OF UPPER-INNER QUADRANT OF RIGHT BREAST IN FEMALE, ESTROGEN RECEPTOR POSITIVE (MULTI): ICD-10-CM

## 2024-07-11 DIAGNOSIS — Z17.0 MALIGNANT NEOPLASM OF UPPER-INNER QUADRANT OF RIGHT BREAST IN FEMALE, ESTROGEN RECEPTOR POSITIVE (MULTI): ICD-10-CM

## 2024-07-11 LAB
ALBUMIN SERPL BCP-MCNC: 3.8 G/DL (ref 3.4–5)
ALP SERPL-CCNC: 76 U/L (ref 33–110)
ALT SERPL W P-5'-P-CCNC: 144 U/L (ref 7–45)
ANION GAP SERPL CALC-SCNC: 12 MMOL/L (ref 10–20)
AST SERPL W P-5'-P-CCNC: 137 U/L (ref 9–39)
BASOPHILS # BLD AUTO: 0.11 X10*3/UL (ref 0–0.1)
BASOPHILS NFR BLD AUTO: 1.5 %
BILIRUB SERPL-MCNC: 0.9 MG/DL (ref 0–1.2)
BUN SERPL-MCNC: 13 MG/DL (ref 6–23)
CALCIUM SERPL-MCNC: 8.6 MG/DL (ref 8.6–10.3)
CANCER AG27-29 SERPL-ACNC: 9.6 U/ML (ref 0–38.6)
CHLORIDE SERPL-SCNC: 106 MMOL/L (ref 98–107)
CO2 SERPL-SCNC: 25 MMOL/L (ref 21–32)
CREAT SERPL-MCNC: 0.75 MG/DL (ref 0.5–1.05)
EGFRCR SERPLBLD CKD-EPI 2021: >90 ML/MIN/1.73M*2
EOSINOPHIL # BLD AUTO: 0.6 X10*3/UL (ref 0–0.7)
EOSINOPHIL NFR BLD AUTO: 7.9 %
ERYTHROCYTE [DISTWIDTH] IN BLOOD BY AUTOMATED COUNT: 12.9 % (ref 11.5–14.5)
GLUCOSE SERPL-MCNC: 90 MG/DL (ref 74–99)
HCT VFR BLD AUTO: 45.5 % (ref 36–46)
HGB BLD-MCNC: 15 G/DL (ref 12–16)
IMM GRANULOCYTES # BLD AUTO: 0.02 X10*3/UL (ref 0–0.7)
IMM GRANULOCYTES NFR BLD AUTO: 0.3 % (ref 0–0.9)
LYMPHOCYTES # BLD AUTO: 1.7 X10*3/UL (ref 1.2–4.8)
LYMPHOCYTES NFR BLD AUTO: 22.5 %
MCH RBC QN AUTO: 30.4 PG (ref 26–34)
MCHC RBC AUTO-ENTMCNC: 33 G/DL (ref 32–36)
MCV RBC AUTO: 92 FL (ref 80–100)
MONOCYTES # BLD AUTO: 0.62 X10*3/UL (ref 0.1–1)
MONOCYTES NFR BLD AUTO: 8.2 %
NEUTROPHILS # BLD AUTO: 4.51 X10*3/UL (ref 1.2–7.7)
NEUTROPHILS NFR BLD AUTO: 59.6 %
NRBC BLD-RTO: 0 /100 WBCS (ref 0–0)
PLATELET # BLD AUTO: 200 X10*3/UL (ref 150–450)
POTASSIUM SERPL-SCNC: 4.4 MMOL/L (ref 3.5–5.3)
PROT SERPL-MCNC: 6.4 G/DL (ref 6.4–8.2)
RBC # BLD AUTO: 4.94 X10*6/UL (ref 4–5.2)
SODIUM SERPL-SCNC: 139 MMOL/L (ref 136–145)
WBC # BLD AUTO: 7.6 X10*3/UL (ref 4.4–11.3)

## 2024-07-11 PROCEDURE — 80053 COMPREHEN METABOLIC PANEL: CPT

## 2024-07-11 PROCEDURE — 36415 COLL VENOUS BLD VENIPUNCTURE: CPT

## 2024-07-11 PROCEDURE — 85025 COMPLETE CBC W/AUTO DIFF WBC: CPT

## 2024-07-11 PROCEDURE — 86300 IMMUNOASSAY TUMOR CA 15-3: CPT

## 2024-07-15 ENCOUNTER — APPOINTMENT (OUTPATIENT)
Dept: HEMATOLOGY/ONCOLOGY | Facility: CLINIC | Age: 59
End: 2024-07-15
Payer: COMMERCIAL

## 2024-07-15 ENCOUNTER — OFFICE VISIT (OUTPATIENT)
Dept: HEMATOLOGY/ONCOLOGY | Facility: CLINIC | Age: 59
End: 2024-07-15
Payer: COMMERCIAL

## 2024-07-15 VITALS
BODY MASS INDEX: 49.82 KG/M2 | SYSTOLIC BLOOD PRESSURE: 135 MMHG | DIASTOLIC BLOOD PRESSURE: 83 MMHG | WEIGHT: 293 LBS | TEMPERATURE: 97.3 F | RESPIRATION RATE: 16 BRPM | OXYGEN SATURATION: 95 % | HEART RATE: 75 BPM

## 2024-07-15 DIAGNOSIS — C50.211 MALIGNANT NEOPLASM OF UPPER-INNER QUADRANT OF RIGHT BREAST IN FEMALE, ESTROGEN RECEPTOR POSITIVE (MULTI): Primary | ICD-10-CM

## 2024-07-15 DIAGNOSIS — R74.8 ELEVATED LIVER ENZYMES: ICD-10-CM

## 2024-07-15 DIAGNOSIS — Z17.0 MALIGNANT NEOPLASM OF UPPER-INNER QUADRANT OF RIGHT BREAST IN FEMALE, ESTROGEN RECEPTOR POSITIVE (MULTI): Primary | ICD-10-CM

## 2024-07-15 PROCEDURE — 3079F DIAST BP 80-89 MM HG: CPT | Performed by: INTERNAL MEDICINE

## 2024-07-15 PROCEDURE — 3008F BODY MASS INDEX DOCD: CPT | Performed by: INTERNAL MEDICINE

## 2024-07-15 PROCEDURE — G2211 COMPLEX E/M VISIT ADD ON: HCPCS | Performed by: INTERNAL MEDICINE

## 2024-07-15 PROCEDURE — 99214 OFFICE O/P EST MOD 30 MIN: CPT | Performed by: INTERNAL MEDICINE

## 2024-07-15 PROCEDURE — 3075F SYST BP GE 130 - 139MM HG: CPT | Performed by: INTERNAL MEDICINE

## 2024-07-15 ASSESSMENT — PAIN SCALES - GENERAL: PAINLEVEL: 0-NO PAIN

## 2024-07-15 NOTE — PROGRESS NOTES
Patient ID: Melissa Bardales is a 59 y.o. female.    Subjective    HPI  Ms. Melissa Bardales is a 60 y/o F who presents for follow-up for early breast cancer. Currently on tamoxifen. Most recent labs from  7/11/24 shows a normal CA 27-29. CMP CBC are unremarkable. Patient denies any bone pain. No new lumps or bumps. She denies any GI issues.     Patient's past medical history, surgical history, family history and social history reviewed.    Review of Systems:   Review of Systems:    Positive per HPI, otherwise negative.   Objective    Vitals:    07/15/24 1024   BP: 135/83   Pulse: 75   Resp: 16   Temp: 36.3 °C (97.3 °F)   SpO2: 95%       Physical Exam  Gen: appears well in clinic, NAD  HEENT: atraumatic head, normocephalic, EOMI, conjunctiva normal  LUNG: no increased WOB, CTAB  CV: No JVD. RRR  GI: soft, NT, ND  LE: no LE edema  Skin: no obvious rashes or lesions on visible skin  Neuro: interactive, no focal deficits noted  Psych: normal mood and affect  Performance Status:  Symptomatic; fully ambulatory    Labs/Imaging/Pathology: personally reviewed reports and images in Epic electronic medical record system. Pertinent results as it related to the plan represented in below in assessment and plan.   Assessment/Plan   1. Stage IA right breast IDC, [id number] (1.6 cm) ER 95,ME 95, HER2 negative, low risk luminal type A  - Initially diagnosed after screening mammogram. Patient is now s/p right partial mastectomy.  - We discussed her final pathology, low risk luminal type A, and given no negative small tumor, will proceed with adjuvant endocrine therapy after radiation therapy.  - Discussed data on risk of recurrence, and given young age, I do recommend endocrine therapy, although she is concerned for side effects.  - Discussed risk of bone loss and advise to start calcium and vitamin D.  - Also discussed long term risk of cardiac disease and discussed importance of maintaining a healthy BMI and following up for regular  age appropriate cardiovascular health such as good blood pressure control and assessing for HLD.  - Started letrozole 2.5 mg pO 3/31/2021.  - Patient switched to Arimidex (6/10/2021) due to possible dizziness from letrozole.  - We did discuss Zometa as an adjuvant treatment to help prevent bone loss and recurrence of breast cancer however declined  - 7/2023 started alendronate weekly for osteopenia    7/15/24:  - Patient does have some increased liver enzymes because of history of fatty liver.   - At this point, we will check an ultrasound of her right upper quadrant and we'll place a referral   - She has been losing weight   - Will continue tamoxifen   - Will plan mammogram in January with follow-up with breast team   - We will plan for follow-up 6 months later   - Will plan for cancer index to review at her next visit   - RTC in January.     2. Depression  - On Wellbutrin for the last  year.  - Will monitor while on Letrozole.   - Doing ok.     3. Hypertension   - Management per PCP.     4. Vertigo  - Appears benign positional. Occurs daily. No recent viral or other provoking factors. Patient to try Epley maneuvers and follow up in 2 weeks.     Reviewed ongoing medical problems and how they relate to her breast cancer, will continue long term monitoring.    RTC in January. This note has been transcribed using a medical scribe and there is a possibility of unintentional typing misprints.     Diagnoses and all orders for this visit:  Malignant neoplasm of upper-inner quadrant of right breast in female, estrogen receptor positive (Multi)  -     Clinic Appointment Request; Future  -     CBC and Auto Differential; Future  -     Comprehensive metabolic panel; Future  -     Cancer Antigen 27-29; Future  Elevated liver enzymes  -     US abdomen limited liver; Future  -     Comprehensive metabolic panel; Future    Rhonda Quiroz MD  Hematology/Oncology  [facility]    Scribe Attestation  By signing my  name below, AMANDA Era Holland Garg attest that this documentation has been prepared under the direction and in the presence of Rhonda Quiroz MD.  Time Spent  Prep time on day of patient encounter: 5 minutes  Time spent directly with patient, family or caregiver: 15 minutes  Additional Time Spent on Patient Care Activities: 5 minutes  Documentation Time: 5 minutes  Other Time Spent: 0 minutes  Total: 30 minutes

## 2024-07-23 ENCOUNTER — APPOINTMENT (OUTPATIENT)
Dept: PRIMARY CARE | Facility: CLINIC | Age: 59
End: 2024-07-23
Payer: COMMERCIAL

## 2024-07-30 ENCOUNTER — HOSPITAL ENCOUNTER (OUTPATIENT)
Dept: RADIOLOGY | Facility: HOSPITAL | Age: 59
Discharge: HOME | End: 2024-07-30
Payer: COMMERCIAL

## 2024-07-30 ENCOUNTER — APPOINTMENT (OUTPATIENT)
Dept: PRIMARY CARE | Facility: CLINIC | Age: 59
End: 2024-07-30
Payer: COMMERCIAL

## 2024-07-30 VITALS
DIASTOLIC BLOOD PRESSURE: 79 MMHG | WEIGHT: 293 LBS | SYSTOLIC BLOOD PRESSURE: 129 MMHG | HEART RATE: 77 BPM | TEMPERATURE: 98 F | BODY MASS INDEX: 49.47 KG/M2

## 2024-07-30 DIAGNOSIS — F33.1 MAJOR DEPRESSIVE DISORDER, RECURRENT EPISODE, MODERATE (MULTI): ICD-10-CM

## 2024-07-30 DIAGNOSIS — E66.01 CLASS 3 SEVERE OBESITY DUE TO EXCESS CALORIES WITHOUT SERIOUS COMORBIDITY WITH BODY MASS INDEX (BMI) OF 50.0 TO 59.9 IN ADULT (MULTI): ICD-10-CM

## 2024-07-30 DIAGNOSIS — R74.8 ELEVATED LIVER ENZYMES: ICD-10-CM

## 2024-07-30 PROCEDURE — 3074F SYST BP LT 130 MM HG: CPT | Performed by: FAMILY MEDICINE

## 2024-07-30 PROCEDURE — 76705 ECHO EXAM OF ABDOMEN: CPT

## 2024-07-30 PROCEDURE — 99213 OFFICE O/P EST LOW 20 MIN: CPT | Performed by: FAMILY MEDICINE

## 2024-07-30 PROCEDURE — 76705 ECHO EXAM OF ABDOMEN: CPT | Performed by: STUDENT IN AN ORGANIZED HEALTH CARE EDUCATION/TRAINING PROGRAM

## 2024-07-30 PROCEDURE — 3078F DIAST BP <80 MM HG: CPT | Performed by: FAMILY MEDICINE

## 2024-07-30 RX ORDER — VENLAFAXINE HYDROCHLORIDE 37.5 MG/1
37.5 CAPSULE, EXTENDED RELEASE ORAL DAILY
Qty: 30 CAPSULE | Refills: 1 | Status: SHIPPED | OUTPATIENT
Start: 2024-07-30

## 2024-07-30 NOTE — PROGRESS NOTES
Subjective   Melissa Bardales is a 59 y.o. female who presents for Weight Check (2mon follow up).    Doing well on the semaglutide 0.6 mg weekly.  Eating smaller portions.  Food noise is less.  Much more active    Had some mild burping and nausea a few weeks ago.  Bowel movements are good.      Overall feels great.  Thinks she might want to be on this for the rest of her life.      Also interested in tapering off her effexor.           Review of Systems    Objective   /79   Pulse 77   Temp 36.7 °C (98 °F)   Wt 139 kg (306 lb 8 oz)   BMI 49.47 kg/m²     Physical Exam  HENT:      Head: Normocephalic and atraumatic.      Mouth/Throat:      Mouth: Mucous membranes are moist.      Pharynx: Oropharynx is clear.   Eyes:      Extraocular Movements: Extraocular movements intact.      Pupils: Pupils are equal, round, and reactive to light.   Cardiovascular:      Rate and Rhythm: Normal rate and regular rhythm.      Heart sounds: Normal heart sounds.   Pulmonary:      Effort: Pulmonary effort is normal.      Breath sounds: Normal breath sounds.   Musculoskeletal:         General: Normal range of motion.      Cervical back: Normal range of motion.   Lymphadenopathy:      Cervical: No cervical adenopathy.   Skin:     General: Skin is warm and dry.   Neurological:      General: No focal deficit present.      Mental Status: She is alert.   Psychiatric:         Mood and Affect: Mood normal.         Assessment/Plan   Assessment & Plan  Class 3 severe obesity due to excess calories without serious comorbidity with body mass index (BMI) of 50.0 to 59.9 in adult (Multi)  Reviewed diet and exercise.    Will increase dose of semaglutide.    Orders:    Follow Up In [facility] - PCP - Established    Follow Up In [facility] - PCP - Established; Future    Major depressive disorder, recurrent episode, moderate (Multi)  Much improved.  Decrease effexor to 37.5 for 3-4 weeks, then discontinue it if you are okay.     Orders:    venlafaxine XR (Effexor-XR) 37.5 mg 24 hr capsule; Take 1 capsule (37.5 mg) by mouth once daily.           There are no Patient Instructions on file for this visit.

## 2024-07-30 NOTE — ASSESSMENT & PLAN NOTE
Reviewed diet and exercise.    Will increase dose of semaglutide.    Orders:    Follow Up In [facility] - PCP - Established    Follow Up In [facility] - PCP - Established; Future

## 2024-08-16 ENCOUNTER — TELEPHONE (OUTPATIENT)
Dept: HEMATOLOGY/ONCOLOGY | Facility: CLINIC | Age: 59
End: 2024-08-16
Payer: COMMERCIAL

## 2024-08-16 DIAGNOSIS — M15.9 PRIMARY OSTEOARTHRITIS INVOLVING MULTIPLE JOINTS: ICD-10-CM

## 2024-08-16 RX ORDER — MELOXICAM 15 MG/1
15 TABLET ORAL DAILY
Qty: 90 TABLET | Refills: 3 | OUTPATIENT
Start: 2024-08-16

## 2024-08-16 NOTE — TELEPHONE ENCOUNTER
"Per Dr. Quiroz \" no focal lesions just fatty liver can plan for FU in 6 mo as planned.\" I told her all information. She was appreciative and denied further questions. Melissa knows she can call us at any time prior to her next appointment.     "

## 2024-08-19 ENCOUNTER — HOSPITAL ENCOUNTER (EMERGENCY)
Facility: HOSPITAL | Age: 59
Discharge: HOME | End: 2024-08-20
Attending: STUDENT IN AN ORGANIZED HEALTH CARE EDUCATION/TRAINING PROGRAM
Payer: COMMERCIAL

## 2024-08-19 ENCOUNTER — APPOINTMENT (OUTPATIENT)
Dept: RADIOLOGY | Facility: HOSPITAL | Age: 59
End: 2024-08-19
Payer: COMMERCIAL

## 2024-08-19 DIAGNOSIS — S42.272A CLOSED TORUS FRACTURE OF PROXIMAL END OF LEFT HUMERUS, INITIAL ENCOUNTER: Primary | ICD-10-CM

## 2024-08-19 PROCEDURE — 70450 CT HEAD/BRAIN W/O DYE: CPT

## 2024-08-19 PROCEDURE — 73070 X-RAY EXAM OF ELBOW: CPT | Mod: LT

## 2024-08-19 PROCEDURE — 2500000004 HC RX 250 GENERAL PHARMACY W/ HCPCS (ALT 636 FOR OP/ED)

## 2024-08-19 PROCEDURE — 73100 X-RAY EXAM OF WRIST: CPT | Mod: LT

## 2024-08-19 PROCEDURE — 29799 UNLISTED PX CASTING/STRPG: CPT | Mod: GP

## 2024-08-19 PROCEDURE — 73030 X-RAY EXAM OF SHOULDER: CPT | Mod: LT

## 2024-08-19 PROCEDURE — 70450 CT HEAD/BRAIN W/O DYE: CPT | Performed by: RADIOLOGY

## 2024-08-19 PROCEDURE — 72125 CT NECK SPINE W/O DYE: CPT | Performed by: RADIOLOGY

## 2024-08-19 PROCEDURE — 72125 CT NECK SPINE W/O DYE: CPT

## 2024-08-19 PROCEDURE — 73030 X-RAY EXAM OF SHOULDER: CPT | Mod: LEFT SIDE | Performed by: RADIOLOGY

## 2024-08-19 PROCEDURE — 73070 X-RAY EXAM OF ELBOW: CPT | Mod: LEFT SIDE | Performed by: RADIOLOGY

## 2024-08-19 PROCEDURE — 73100 X-RAY EXAM OF WRIST: CPT | Mod: LEFT SIDE | Performed by: RADIOLOGY

## 2024-08-19 PROCEDURE — 96372 THER/PROPH/DIAG INJ SC/IM: CPT

## 2024-08-19 PROCEDURE — 99285 EMERGENCY DEPT VISIT HI MDM: CPT | Mod: 25

## 2024-08-19 ASSESSMENT — PAIN DESCRIPTION - LOCATION
LOCATION: ARM
LOCATION: ARM

## 2024-08-19 ASSESSMENT — LIFESTYLE VARIABLES
EVER FELT BAD OR GUILTY ABOUT YOUR DRINKING: NO
TOTAL SCORE: 0
EVER HAD A DRINK FIRST THING IN THE MORNING TO STEADY YOUR NERVES TO GET RID OF A HANGOVER: NO
HAVE YOU EVER FELT YOU SHOULD CUT DOWN ON YOUR DRINKING: NO
HAVE PEOPLE ANNOYED YOU BY CRITICIZING YOUR DRINKING: NO

## 2024-08-19 ASSESSMENT — PAIN DESCRIPTION - ORIENTATION
ORIENTATION: LEFT
ORIENTATION: LEFT

## 2024-08-19 ASSESSMENT — PAIN DESCRIPTION - PAIN TYPE: TYPE: ACUTE PAIN

## 2024-08-19 ASSESSMENT — PAIN - FUNCTIONAL ASSESSMENT: PAIN_FUNCTIONAL_ASSESSMENT: 0-10

## 2024-08-19 ASSESSMENT — PAIN SCALES - GENERAL
PAINLEVEL_OUTOF10: 10 - WORST POSSIBLE PAIN
PAINLEVEL_OUTOF10: 10 - WORST POSSIBLE PAIN

## 2024-08-20 ENCOUNTER — HOSPITAL ENCOUNTER (OUTPATIENT)
Dept: RADIOLOGY | Facility: HOSPITAL | Age: 59
Discharge: HOME | End: 2024-08-20
Payer: COMMERCIAL

## 2024-08-20 ENCOUNTER — OFFICE VISIT (OUTPATIENT)
Dept: ORTHOPEDIC SURGERY | Facility: CLINIC | Age: 59
End: 2024-08-20
Payer: COMMERCIAL

## 2024-08-20 VITALS
OXYGEN SATURATION: 95 % | WEIGHT: 293 LBS | TEMPERATURE: 98.1 F | RESPIRATION RATE: 20 BRPM | HEART RATE: 86 BPM | HEIGHT: 66 IN | DIASTOLIC BLOOD PRESSURE: 90 MMHG | BODY MASS INDEX: 47.09 KG/M2 | SYSTOLIC BLOOD PRESSURE: 192 MMHG

## 2024-08-20 VITALS — BODY MASS INDEX: 47.09 KG/M2 | WEIGHT: 293 LBS | HEIGHT: 66 IN

## 2024-08-20 DIAGNOSIS — S42.295A OTHER CLOSED NONDISPLACED FRACTURE OF PROXIMAL END OF LEFT HUMERUS, INITIAL ENCOUNTER: Primary | ICD-10-CM

## 2024-08-20 DIAGNOSIS — S42.295A OTHER CLOSED NONDISPLACED FRACTURE OF PROXIMAL END OF LEFT HUMERUS, INITIAL ENCOUNTER: ICD-10-CM

## 2024-08-20 PROCEDURE — 73200 CT UPPER EXTREMITY W/O DYE: CPT | Mod: LEFT SIDE | Performed by: RADIOLOGY

## 2024-08-20 PROCEDURE — 99204 OFFICE O/P NEW MOD 45 MIN: CPT | Performed by: INTERNAL MEDICINE

## 2024-08-20 PROCEDURE — L3670 SO ACRO/CLAV CAN WEB PRE OTS: HCPCS | Performed by: INTERNAL MEDICINE

## 2024-08-20 PROCEDURE — 73200 CT UPPER EXTREMITY W/O DYE: CPT | Mod: LT

## 2024-08-20 PROCEDURE — 2500000001 HC RX 250 WO HCPCS SELF ADMINISTERED DRUGS (ALT 637 FOR MEDICARE OP)

## 2024-08-20 PROCEDURE — 3008F BODY MASS INDEX DOCD: CPT | Performed by: INTERNAL MEDICINE

## 2024-08-20 PROCEDURE — 99213 OFFICE O/P EST LOW 20 MIN: CPT | Performed by: INTERNAL MEDICINE

## 2024-08-20 RX ORDER — OXYCODONE HYDROCHLORIDE 5 MG/1
5 TABLET ORAL ONCE
Status: COMPLETED | OUTPATIENT
Start: 2024-08-20 | End: 2024-08-20

## 2024-08-20 RX ORDER — OXYCODONE HYDROCHLORIDE 5 MG/1
5 TABLET ORAL EVERY 6 HOURS PRN
Qty: 10 TABLET | Refills: 0 | Status: SHIPPED | OUTPATIENT
Start: 2024-08-20 | End: 2024-08-27

## 2024-08-20 RX ORDER — OXYCODONE HYDROCHLORIDE 5 MG/1
5 TABLET ORAL EVERY 6 HOURS PRN
Qty: 15 TABLET | Refills: 0 | Status: SHIPPED | OUTPATIENT
Start: 2024-08-19 | End: 2024-08-20

## 2024-08-20 NOTE — ED PROVIDER NOTES
Emergency Department Provider Note        History of Present Illness     History provided by: Patient  Limitations to History: None  External Records Reviewed with Brief Summary: None    HPI:  Melissa Bardales is a 59 y.o. female with a history of HTN and breast cancer presenting to the ED with complaint of a mechanical fall.  Patient reports that she tripped over the kids at home or an object and fell onto her left side.  Denies hitting her head, LOC, or blood thinner use. Endorsing pain across her entire left arm.  Denies any recent sick symptoms, GI symptoms, chest pain, shortness of breath, or urinary symptoms.    Physical Exam   Triage vitals:  T 36.7 °C (98.1 °F)  HR 74  BP (!) 183/81  RR 20  O2 94 % None (Room air)    General: Awake, alert, in no acute distress.  Morbidly obese.  Eyes: Gaze conjugate.  No scleral icterus or injection  HENT: Normo-cephalic, atraumatic. No stridor  CV: Regular rate, regular rhythm. Radial pulses 2+ bilaterally  Resp: Breathing non-labored, speaking in full sentences.  Clear to auscultation bilaterally  GI: Soft, non-distended, non-tender. No rebound or guarding.  MSK/Extremities: No gross bony deformities.  Tenderness to palpation from left shoulder down to left wrist.  Left arm kept held to body.  Moving RUE and BL LE  Skin: Warm. Appropriate color  Neuro: Alert. Oriented. Face symmetric. Speech is fluent.  Gross strength and sensation intact in b/l UE and LEs  Psych: Appropriate mood and affect    Medical Decision Making & ED Course   Medical Decision Makin y.o. female presenting to the ED with complaint of a mechanical fall and left arm pain.  Patient is afebrile, sinus, hypertensive, saturating well on room air, and in no acute distress.  CT head and C-spine and x-rays of the left shoulder, elbow, and wrist ordered.  No break in skin to warrant tetanus shot.    X-ray of the left shoulder reveals a comminuted left Humeral torus fracture and patient placed in a  coaptation splint with sling.  2+ radial pulses bilaterally post splint with full sensation and strength of the hand.  Patient and  reports possible codeine allergy when she was a kid as she had a seizure shortly afterward but has tolerated oxycodone.  1 dose of oxycodone provided in the ER.  Prescription sent to her pharmacy as needed for breakthrough pain.  Instructed to follow-up with orthopedic surgery tomorrow and also given information for the orthopedic walk-in clinic.  ----  Scoring Tools Utilized: NEXUS Criteria Score: 1     Social Determinants of Health which Significantly Impact Care: None identified     EKG Independent Interpretation: EKG not obtained    Independent Result Review and Interpretation: Relevant laboratory and radiographic results were reviewed and independently interpreted by myself.  As necessary, they are commented on in the ED Course.    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  Patient placed in a coaptation splint and sling. [ES]   2329 IMPRESSION:  1. Acute comminuted nondisplaced left proximal humeral fracture.   [TL]   2329 IMPRESSION:  1. 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 Otherwise unremarkable left  wrist radiographs.       [TL]   2330 IMPRESSION:  1. No acute fracture or malalignment of the left elbow.   [TL]   Tue Aug 20, 2024   0002 C-spine unimpressive for ICH, fracture, herniation [ES]      ED Course User Index  [ES] Jesus Klein MD  [TL] Solomon Nichols DO         Diagnoses as of 08/20/24 0027   Closed torus fracture of proximal end of left humerus, initial encounter       Disposition   As a  result of the work-up, the patient was discharged home.  she was informed of her diagnosis and instructed to come back with any concerns or worsening of condition.  she and was agreeable to the plan as discussed above.  she was given the opportunity to ask questions.  All of the patient's questions were answered.    Procedures   Procedures    Patient seen and discussed with ED attending physician.    Jesus Klein MD  Emergency Medicine     Jesus Klein MD  Resident  08/20/24 0027

## 2024-08-20 NOTE — DISCHARGE INSTRUCTIONS
Please utilize anti-inflammatories acetaminophen (Tylenol) and ibuprofen (Advil) or naproxen (aleve) for your pain as recommended. You can take both acetaminophen and ibuprofen together.     Seek immediate medical attention should you have:   Numbness, tingling, weakness, fevers, chills, nausea, palpitations, confusion, vomiting, dizziness, painful urination, inability to control your urine or bowel movements, or any other concerning symptoms.

## 2024-08-20 NOTE — LETTER
August 20, 2024     Patient: Melissa Bardales   YOB: 1965   Date of Visit: 8/20/2024       To Whom It May Concern:    Melissa Bardales was seen in my clinic on 8/20/2024 at 8:00 am. Please excuse Melissa for her absence from work on this day to make the appointment. Please excuse her off work for one week (8/27/24).    If you have any questions or concerns, please don't hesitate to call.         Sincerely,         Eloisa Noel MD

## 2024-08-20 NOTE — PROGRESS NOTES
Acute Injury New Patient Visit    CC:   Chief Complaint   Patient presents with    Left Shoulder - Pain, New Patient Visit     Tripped and Fell onto Left Arm 8/19/24 - X-Rays @ [facility]       HPI: Melissa is a 59 y.o. female presents today for evaluation for acute left shoulder injury sustained yesterday after she tripped and fell. She was evaluated in the ER and had x-rays taken, was found to have a fracture and placed a new posterior arm splint. She is here for initial evaluation.        Review of Systems   GENERAL: Negative for malaise, significant weight loss, fever  MUSCULOSKELETAL: See HPI  NEURO:  Negative for numbness / tingling     Past Medical History  Past Medical History:   Diagnosis Date    Allergic 1978    Anemia 1991    Arthritis 2004    Breast cancer (Multi) Jan., 2021    Depression 2009    Eczema 2018    History of kidney stones     Hypertension     Obesity ??    Personal history of irradiation     Seizures (Multi) 1980    Varicella 1981    Visual impairment 1973       Medication review  Medication Documentation Review Audit       Reviewed by DENIZ Kiran (Technologist) on 08/20/24 at 0809      Medication Order Taking? Sig Documenting Provider Last Dose Status   alendronate (Fosamax) 70 mg tablet [id number] No Take 1 tablet (70 mg) by mouth every 7 days. David Luong, APRN-CNP Taking Active   atenolol (Tenormin) 100 mg tablet [id number] No Take 1 tablet (100 mg) by mouth once daily at bedtime. Sonya Martinez MD Taking Active   C/sourcherry/celery/grape seed (TART CHERRY ORAL) [id number] No Take 1,200 mg by mouth once daily. Historical Provider, MD Taking Active   calcium carbonate-vitamin D3 500 mg-5 mcg (200 unit) tablet [id number] No Take 1 tablet by mouth once daily. Historical Provider, MD Taking Active   ergocalciferol (Vitamin D-2) 1.25 MG (66340 UT) capsule [id number] No TAKE 1 CAPSULE MONTHLY Sonya Martinez MD Taking Active   losartan (Cozaar) 50 mg tablet [id number] No TAKE 1  TABLET DAILY Sonya Martinez MD Taking Active   magnesium oxide (Mag-Ox) 400 mg tablet [id number] No 1 tablet (400 mg) once daily. Historical Provider, MD Taking Active   minocycline (Zilxi) 1.5 % foam [id number] No Apply topically once daily. Historical Provider, MD Taking Active     Discontinued 08/20/24 0023   oxyCODONE (Roxicodone) 5 mg immediate release tablet [id number]  Take 1 tablet (5 mg) by mouth every 6 hours if needed for severe pain (7 - 10) for up to 7 days. Jesus Klein MD  Active   semaglutide 0.25 mg or 0.5 mg (2 mg/3 mL) pen injector [id number] No Inject 0.25 mg under the skin 1 (one) time per week. Increase to 0.5 mg weekly after 2 weeks if you are tolerating it. Sonya Martinez MD Taking Active   tamoxifen (Nolvadex) 20 mg tablet [id number] No Take 1 tablet (20 mg total) by mouth once daily. David Luong, APRN-CNP Taking Active   venlafaxine XR (Effexor-XR) 37.5 mg 24 hr capsule [id number]  Take 1 capsule (37.5 mg) by mouth once daily. Sonya Martinez MD  Active                    Allergies  Allergies   Allergen Reactions    Amlodipine Swelling    Codeine Seizure and Other     Seizure long time ago    Lisinopril Swelling and Rash       Social History  Social History     Socioeconomic History    Marital status:      Spouse name: Not on file    Number of children: Not on file    Years of education: Not on file    Highest education level: Not on file   Occupational History    Not on file   Tobacco Use    Smoking status: Never    Smokeless tobacco: Never   Vaping Use    Vaping status: Never Used   Substance and Sexual Activity    Alcohol use: Not Currently     Comment: rare    Drug use: Never    Sexual activity: Never   Other Topics Concern    Not on file   Social History Narrative    Not on file     Social Determinants of Health     Financial Resource Strain: Not on file   Food Insecurity: Not on file   Transportation Needs: Not on file   Physical Activity: Not on file    Stress: Not on file   Social Connections: Not on file   Intimate Partner Violence: Not on file   Housing Stability: Not on file       Surgical History  Past Surgical History:   Procedure Laterality Date    BREAST LUMPECTOMY Right Jan., 2021    CHOLECYSTECTOMY  2019?    KNEE SURGERY      OTHER SURGICAL HISTORY  09/17/2019    Cholecystectomy    OTHER SURGICAL HISTORY  09/17/2019    Tonsillectomy with adenoidectomy    TONSILLECTOMY  1970    WISDOM TOOTH EXTRACTION  1984       Physical Exam:  GENERAL:  Patient is awake, alert, and oriented to person place and time.  Patient appears well nourished and well kept.  Affect Calm, Not Acutely Distressed.  HEENT:  Normocephalic, Atraumatic, EOMI  CARDIOVASCULAR:  Hemodynamically stable.  RESPIRATORY:  Normal respirations with unlabored breathing.  Extremity: Left shoulder shows skin is intact.  There is no erythema or warmth.  There is no clinical signs of infection.  Limited range of motion due to pain and guarding.  Pain over the proximal humerus.  Unable to form a satisfactory Candelaria or Neer's test due to pain and guarding.  Unable to perform a satisfactory empty can test due to pain and guarding.  Neurovascularly intact.  Distal pulses are palpable.      Diagnostics: X-rays reviewed  CT head wo IV contrast, CT cervical spine wo IV contrast  Narrative: Interpreted By:  Adair Heredia,   STUDY:  CT HEAD WO IV CONTRAST; CT CERVICAL SPINE WO IV CONTRAST;  8/19/2024  10:47 pm      INDICATION:  Signs/Symptoms:[facility] fall at home      COMPARISON:  None.      ACCESSION NUMBER(S):  [id number]; [id number]      ORDERING CLINICIAN:  ИВАН OCONNOR      TECHNIQUE:  Axial noncontrast CT images of head with coronal and sagittal  reconstructed images. Axial noncontrast CT images of the cervical  spine with coronal and sagittal reconstructed images.      FINDINGS:  Body habitus limits sensitivity      CT HEAD:      BRAIN PARENCHYMA:  No evidence of acute intraparenchymal hemorrhage  or  parenchymal evidence of acute large territory ischemic infarct. No  mass-effect, midline shift or effacement of cerebral sulci.  Gray-white matter distinction is preserved. Global volume loss and  moderate chronic small vessel ischemic change      VENTRICLES and EXTRA-AXIAL SPACES:  No acute extra-axial or  intraventricular hemorrhage. Ventricles and sulci are age-concordant.      PARANASAL SINUSES/MASTOIDS:  No hemorrhage or air-fluid levels within  the visualized paranasal sinuses. The mastoid air cells are  well-aerated.      CALVARIUM/ORBITS:  No skull fracture.  The orbits and globes are  intact to the extent visualized.      EXTRACRANIAL SOFT TISSUES: No discernible abnormality.          CT CERVICAL SPINE:      Prevertebral soft tissue within normal limits..      Scattered degenerative changes most notable at C5-6 and C6-7.      No findings of acute fracture.      Impression: CT HEAD:  No acute intracranial abnormality or calvarial fracture.          CT CERVICAL SPINE:  No acute fracture or traumatic malalignment of the cervical spine.  Scattered degenerative changes      Please note body habitus limits sensitivity      Signed by: Adair Heredia 8/19/2024 11:57 PM  Dictation workstation:   [id number]  XR wrist left 1-2 views  Narrative: Interpreted By:  Poonam Calderón,   STUDY:  Left wrist, two views.      INDICATION:  Signs/Symptoms:mechanical fall, pain and tenderness.      COMPARISON:  None.      ACCESSION NUMBER(S):  [id number]      ORDERING CLINICIAN:  ИВАН OCONNOR      FINDINGS:  No acute fracture or malalignment.  Mild 1st CMC joint degenerative changes with osteophytes.  Soft tissues are within normal limits.      Impression: 1. 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 Otherwise unremarkable left  wrist radiographs.      MACRO:  None.      Signed by: Poonam Calderón 8/19/2024 11:13 PM  Dictation workstation:   [id number]  XR elbow left 1-2 views  Narrative: Interpreted By:  Poonam Calderón,    STUDY:  Left elbow, 2 views.      INDICATION:  Signs/Symptoms:mechanical fall, pain and tenderness.      COMPARISON:  None.      ACCESSION NUMBER(S):  [id number]      ORDERING CLINICIAN:  ИВАН OCONNOR      FINDINGS:  No acute fracture or malalignment.  No elbow joint effusion or abnormal fat pad elevation.  No significant degenerative changes.      Impression: 1. No acute fracture or malalignment of the left elbow.      MACRO:  None.      Signed by: Poonam Calderón 8/19/2024 11:12 PM  Dictation workstation:   [id number]  XR shoulder left 2+ views  Narrative: Interpreted By:  Poonam Calderón,   STUDY:  Left shoulder, 3 views.      INDICATION:  Signs/Symptoms:fall, pain and tenderness.      COMPARISON:  None.      ACCESSION NUMBER(S):  [id number]      ORDERING CLINICIAN:  ИВАН OCONNOR      FINDINGS:  There is an acute comminuted nondisplaced left proximal humeral  fracture involving the surgical neck and greater tuberosity. Mild  acromioclavicular joint degenerative changes with osteophytes. No  malalignment of the glenohumeral joint.      Impression: 1. Acute comminuted nondisplaced left proximal humeral fracture.      MACRO:      None.      Signed by: Poonam Calderón 8/19/2024 11:10 PM  Dictation workstation:   [id number]      Procedure: None    Assessment: Left comminuted proximal humerus fracture    Plan: Melissa presents today for initial evaluation for acute left comminuted proximal humerus fracture sustained yesterday after a fall.  We did recommend AP and  axillary view x-rays today to evaluate for any dislocation, however she states that she is unable to perform the x-rays today due to pain, thus we recommended a stat CT of the left shoulder to evaluate for displaced comminuted proximal humerus fracture, will place her into a sling for comfort, and she will follow-up with Dr JUDE Tipton after the CT scan, whom she has seen before in the past for her knees. She will be off work for a week.  No fracture  charge for today's visit, as patient will be following up with Dr. Leonel Tipton for continued fracture care.    No orders of the defined types were placed in this encounter.     At the conclusion of the visit there were no further questions by the patient/family regarding their plan of care.  Patient was instructed to call or return with any issues, questions, or concerns regarding their injury and/or treatment plan described above.     08/20/24 at 8:12 AM - Eloisa Noel MD  Scribe Attestation  By signing my name below, I, Slick Christian, Scribe   attest that this documentation has been prepared under the direction and in the presence of Eloisa Noel MD.    Office: [phone]    This note was prepared using voice recognition software.  The details of this note are correct and have been reviewed, and corrected to the best of my ability.  Some grammatical errors may persist related to the Dragon software.

## 2024-08-21 ENCOUNTER — OFFICE VISIT (OUTPATIENT)
Dept: ORTHOPEDIC SURGERY | Facility: CLINIC | Age: 59
End: 2024-08-21
Payer: COMMERCIAL

## 2024-08-21 DIAGNOSIS — S42.295A OTHER CLOSED NONDISPLACED FRACTURE OF PROXIMAL END OF LEFT HUMERUS, INITIAL ENCOUNTER: ICD-10-CM

## 2024-08-21 PROCEDURE — 99214 OFFICE O/P EST MOD 30 MIN: CPT | Performed by: ORTHOPAEDIC SURGERY

## 2024-08-21 PROCEDURE — 1036F TOBACCO NON-USER: CPT | Performed by: ORTHOPAEDIC SURGERY

## 2024-08-21 PROCEDURE — 23600 CLTX PROX HUMRL FX W/O MNPJ: CPT | Performed by: ORTHOPAEDIC SURGERY

## 2024-08-21 RX ORDER — OXYCODONE AND ACETAMINOPHEN 5; 325 MG/1; MG/1
2 TABLET ORAL EVERY 6 HOURS PRN
Qty: 24 TABLET | Refills: 0 | Status: SHIPPED | OUTPATIENT
Start: 2024-08-21 | End: 2024-08-24

## 2024-08-21 NOTE — LETTER
August 21, 2024     Patient: Melissa Bardales   YOB: 1965   Date of Visit: 8/21/2024       To Whom it May Concern:    Melissa Bardales was seen in my clinic on 8/21/2024. She  will be off work for at least 3 weeks and may require the assistance of her  Mehul for daily living activities .    If you have any questions or concerns, please don't hesitate to call.         Sincerely,          Andrea Tipton MD        CC: No Recipients

## 2024-08-21 NOTE — PROGRESS NOTES
History: Melissa is here for her left shoulder.  She fell 2 days ago injuring the left arm.  She was noted to have a comminuted proximal humerus fracture.  She was seen in the walk-in clinic and sent for a CT scan.  She is still having some consistent high-level pain.    Past medical history: Multiple  Medications: Multiple  Allergies: No known drug allergies    Please refer to the intake H&P regarding the patient's review of systems, family history and social history as was done today    HEENT: Normal  Lungs: Clear to auscultation  Heart: RRR  Abdomen: Soft, nontender  Skin: clear  Extremity: There is mild swelling and ecchymosis of the left arm.  She does not have pain in the elbow wrist or hand.  She can move the fingers well.  There is pain with any attempted shoulder motion.  Mild soreness in the left rib cage and neck as well.  Contralateral exam is normal for strength, motion, stability and neurovascular assessment.    Radiographs: X-rays and CT scan imaging show a comminuted proximal humerus fracture with extension toward the greater tuberosity.  Humeral head is intact as is the glenohumeral articulation.    Assessment: Comminuted left three-part proximal humerus fracture, morbid obesity    Plan: We discussed several options.  Given her fracture pattern I believe the conservative treatment is warranted.  We discussed that this is a painful fracture and she was given a new prescription for the Percocet.  She can take 2 at a time if needed and then wean back to 1 with Tylenol and Motrin intermittently.  She will ice extensively and use her sling which was adjusted today.  Will see her in 2 to 3 weeks for recheck with two-view shoulder x-rays.  If doing better we can start some gentle therapy.  We did discuss the high risk of permanent stiffness with these injuries we will try to initiate motion as soon as possible to minimize this risk.  All questions were answered today with the patient.    This note was  generated with voice recognition software and may contain grammatical errors.

## 2024-09-05 ENCOUNTER — OFFICE VISIT (OUTPATIENT)
Dept: GASTROENTEROLOGY | Facility: CLINIC | Age: 59
End: 2024-09-05
Payer: COMMERCIAL

## 2024-09-05 ENCOUNTER — LAB (OUTPATIENT)
Dept: LAB | Facility: LAB | Age: 59
End: 2024-09-05
Payer: COMMERCIAL

## 2024-09-05 VITALS
RESPIRATION RATE: 18 BRPM | HEART RATE: 74 BPM | HEIGHT: 66 IN | TEMPERATURE: 98.1 F | BODY MASS INDEX: 47.09 KG/M2 | WEIGHT: 293 LBS

## 2024-09-05 DIAGNOSIS — K76.0 NON-ALCOHOLIC FATTY LIVER DISEASE: Primary | ICD-10-CM

## 2024-09-05 DIAGNOSIS — R74.8 ELEVATED LIVER ENZYMES: ICD-10-CM

## 2024-09-05 LAB
ALBUMIN SERPL BCP-MCNC: 3.8 G/DL (ref 3.4–5)
ALP SERPL-CCNC: 160 U/L (ref 33–110)
ALT SERPL W P-5'-P-CCNC: 359 U/L (ref 7–45)
ANION GAP SERPL CALC-SCNC: 11 MMOL/L (ref 10–20)
AST SERPL W P-5'-P-CCNC: 245 U/L (ref 9–39)
BASOPHILS # BLD AUTO: 0.07 X10*3/UL (ref 0–0.1)
BASOPHILS NFR BLD AUTO: 0.7 %
BILIRUB SERPL-MCNC: 0.8 MG/DL (ref 0–1.2)
BUN SERPL-MCNC: 12 MG/DL (ref 6–23)
CALCIUM SERPL-MCNC: 9.2 MG/DL (ref 8.6–10.6)
CERULOPLASMIN SERPL-MCNC: 49 MG/DL (ref 20–60)
CHLORIDE SERPL-SCNC: 105 MMOL/L (ref 98–107)
CO2 SERPL-SCNC: 26 MMOL/L (ref 21–32)
CREAT SERPL-MCNC: 0.67 MG/DL (ref 0.5–1.05)
EGFRCR SERPLBLD CKD-EPI 2021: >90 ML/MIN/1.73M*2
EOSINOPHIL # BLD AUTO: 0.29 X10*3/UL (ref 0–0.7)
EOSINOPHIL NFR BLD AUTO: 2.9 %
ERYTHROCYTE [DISTWIDTH] IN BLOOD BY AUTOMATED COUNT: 12.4 % (ref 11.5–14.5)
FERRITIN SERPL-MCNC: 576 NG/ML (ref 8–150)
GLUCOSE SERPL-MCNC: 93 MG/DL (ref 74–99)
HCT VFR BLD AUTO: 45.4 % (ref 36–46)
HGB BLD-MCNC: 15 G/DL (ref 12–16)
IGA SERPL-MCNC: 253 MG/DL (ref 70–400)
IGG SERPL-MCNC: 1250 MG/DL (ref 700–1600)
IGM SERPL-MCNC: 98 MG/DL (ref 40–230)
IMM GRANULOCYTES # BLD AUTO: 0.04 X10*3/UL (ref 0–0.7)
IMM GRANULOCYTES NFR BLD AUTO: 0.4 % (ref 0–0.9)
IRON SATN MFR SERPL: 27 % (ref 25–45)
IRON SERPL-MCNC: 99 UG/DL (ref 35–150)
LYMPHOCYTES # BLD AUTO: 1.51 X10*3/UL (ref 1.2–4.8)
LYMPHOCYTES NFR BLD AUTO: 15 %
MCH RBC QN AUTO: 30.7 PG (ref 26–34)
MCHC RBC AUTO-ENTMCNC: 33 G/DL (ref 32–36)
MCV RBC AUTO: 93 FL (ref 80–100)
MONOCYTES # BLD AUTO: 0.78 X10*3/UL (ref 0.1–1)
MONOCYTES NFR BLD AUTO: 7.8 %
NEUTROPHILS # BLD AUTO: 7.37 X10*3/UL (ref 1.2–7.7)
NEUTROPHILS NFR BLD AUTO: 73.2 %
NRBC BLD-RTO: 0 /100 WBCS (ref 0–0)
PLATELET # BLD AUTO: 308 X10*3/UL (ref 150–450)
POTASSIUM SERPL-SCNC: 4.3 MMOL/L (ref 3.5–5.3)
PROT SERPL-MCNC: 6.8 G/DL (ref 6.4–8.2)
RBC # BLD AUTO: 4.88 X10*6/UL (ref 4–5.2)
SODIUM SERPL-SCNC: 138 MMOL/L (ref 136–145)
TIBC SERPL-MCNC: 367 UG/DL (ref 240–445)
UIBC SERPL-MCNC: 268 UG/DL (ref 110–370)
WBC # BLD AUTO: 10.1 X10*3/UL (ref 4.4–11.3)

## 2024-09-05 PROCEDURE — 80053 COMPREHEN METABOLIC PANEL: CPT

## 2024-09-05 PROCEDURE — 86015 ACTIN ANTIBODY EACH: CPT

## 2024-09-05 PROCEDURE — 83550 IRON BINDING TEST: CPT

## 2024-09-05 PROCEDURE — 82390 ASSAY OF CERULOPLASMIN: CPT

## 2024-09-05 PROCEDURE — 1036F TOBACCO NON-USER: CPT | Performed by: NURSE PRACTITIONER

## 2024-09-05 PROCEDURE — 99204 OFFICE O/P NEW MOD 45 MIN: CPT | Performed by: NURSE PRACTITIONER

## 2024-09-05 PROCEDURE — 99214 OFFICE O/P EST MOD 30 MIN: CPT | Performed by: NURSE PRACTITIONER

## 2024-09-05 PROCEDURE — 86038 ANTINUCLEAR ANTIBODIES: CPT

## 2024-09-05 PROCEDURE — 86381 MITOCHONDRIAL ANTIBODY EACH: CPT

## 2024-09-05 PROCEDURE — 82784 ASSAY IGA/IGD/IGG/IGM EACH: CPT

## 2024-09-05 PROCEDURE — 3008F BODY MASS INDEX DOCD: CPT | Performed by: NURSE PRACTITIONER

## 2024-09-05 PROCEDURE — 36415 COLL VENOUS BLD VENIPUNCTURE: CPT

## 2024-09-05 PROCEDURE — 81517 LIVER DS ALYS 3 BMRK SRM ALG: CPT

## 2024-09-05 PROCEDURE — 86376 MICROSOMAL ANTIBODY EACH: CPT

## 2024-09-05 PROCEDURE — 82728 ASSAY OF FERRITIN: CPT

## 2024-09-05 PROCEDURE — 85025 COMPLETE CBC W/AUTO DIFF WBC: CPT

## 2024-09-05 PROCEDURE — 83516 IMMUNOASSAY NONANTIBODY: CPT

## 2024-09-05 PROCEDURE — 83540 ASSAY OF IRON: CPT

## 2024-09-05 PROCEDURE — 86256 FLUORESCENT ANTIBODY TITER: CPT

## 2024-09-05 ASSESSMENT — ENCOUNTER SYMPTOMS
FREQUENCY: 0
WHEEZING: 0
NAUSEA: 0
HEMATURIA: 0
COLOR CHANGE: 0
NUMBNESS: 0
PALPITATIONS: 0
VOICE CHANGE: 0
COUGH: 0
CONSTIPATION: 0
SLEEP DISTURBANCE: 0
UNEXPECTED WEIGHT CHANGE: 0
TREMORS: 0
ABDOMINAL DISTENTION: 0
ABDOMINAL PAIN: 0
CHILLS: 0
SHORTNESS OF BREATH: 0
DIFFICULTY URINATING: 0
BRUISES/BLEEDS EASILY: 0
FEVER: 0
VOMITING: 0
DIZZINESS: 0
JOINT SWELLING: 1
APPETITE CHANGE: 0
DIARRHEA: 0
HEADACHES: 0
CONFUSION: 0
BLOOD IN STOOL: 0
LIGHT-HEADEDNESS: 0
TROUBLE SWALLOWING: 0
AGITATION: 0
WEAKNESS: 0
DYSURIA: 0

## 2024-09-05 ASSESSMENT — PAIN SCALES - GENERAL: PAINLEVEL: 6

## 2024-09-05 NOTE — ASSESSMENT & PLAN NOTE
Pt has had elevated liver enzymes for a few years, suspected to be related to fatty liver.  However more recently these have gone up, despite losing 46 lbs with GLP1.    Full liver disease work-up will be ordered to rule out additional causes including autoimmune liver disease.

## 2024-09-05 NOTE — PROGRESS NOTES
Patient ID: Melissa Bardales is a 59 y.o. female who presents for elevated liver enzymes.    HPI  59 year old with history of obesity, depression, breast cancer (on tamoxifen) and HTN referred for elevated liver enzymes.  She has known about having fatty liver for a few years with elevated liver enzymes and imaging of hepatic steatosis.    She has been on semaglutide since March and is down 46 lbs since then.  However, her liver enzymes have gone up into the mid 100s since then.  She has a history of taking meloxicam for chronic pain but stopped that 5 weeks ago.    She does not consume ETOH or have a history of drug use.  Pt has 2 children with autoimmune disease-one of which is Crohns.  Recently had fall 2 weeks ago and fractured her left humerus.  She is in a sling right now.    Denies jaundice, icterus, itching, abdominal distension, edema, bleeding or confusion.    Denies fevers, chills, abdominal pain.       Review of Systems   Constitutional:  Negative for appetite change, chills, fever and unexpected weight change.   HENT:  Negative for mouth sores, nosebleeds, trouble swallowing and voice change.    Eyes:  Negative for visual disturbance.   Respiratory:  Negative for cough, shortness of breath and wheezing.    Cardiovascular:  Negative for chest pain, palpitations and leg swelling.   Gastrointestinal:  Negative for abdominal distention, abdominal pain, blood in stool, constipation, diarrhea, nausea and vomiting.   Genitourinary:  Negative for decreased urine volume, difficulty urinating, dysuria, frequency, hematuria and urgency.   Musculoskeletal:  Positive for joint swelling. Negative for gait problem.   Skin:  Negative for color change, pallor and rash.   Neurological:  Negative for dizziness, tremors, weakness, light-headedness, numbness and headaches.   Hematological:  Does not bruise/bleed easily.   Psychiatric/Behavioral:  Negative for agitation, behavioral problems, confusion and sleep  disturbance.        Objective   Physical Exam  Constitutional:       General: She is awake.      Appearance: Normal appearance. She is well-developed. She is obese.   HENT:      Head: Normocephalic and atraumatic.      Right Ear: Hearing normal.      Left Ear: Hearing normal.      Nose: Nose normal.      Mouth/Throat:      Lips: Pink.      Mouth: Mucous membranes are moist.   Eyes:      General: Lids are normal.      Extraocular Movements: Extraocular movements intact.      Conjunctiva/sclera: Conjunctivae normal.      Pupils: Pupils are equal, round, and reactive to light.   Cardiovascular:      Rate and Rhythm: Normal rate and regular rhythm.      Pulses: Normal pulses.      Heart sounds: Normal heart sounds.   Pulmonary:      Effort: Pulmonary effort is normal.      Breath sounds: Normal breath sounds.   Abdominal:      General: Abdomen is flat. Bowel sounds are normal.      Palpations: Abdomen is soft.   Musculoskeletal:      Cervical back: Normal range of motion and neck supple.      Comments: Left shoulder in sling from fracture   Feet:      Right foot:      Skin integrity: Skin integrity normal.      Left foot:      Skin integrity: Skin integrity normal.   Skin:     General: Skin is warm.   Neurological:      General: No focal deficit present.      Mental Status: She is alert and oriented to person, place, and time.      Cranial Nerves: Cranial nerves 2-12 are intact.      Sensory: Sensation is intact.      Motor: Motor function is intact.      Coordination: Coordination is intact.      Gait: Gait is intact.   Psychiatric:         Attention and Perception: Attention and perception normal.         Mood and Affect: Mood normal.         Speech: Speech normal.         Behavior: Behavior is cooperative.         Thought Content: Thought content normal.         Cognition and Memory: Cognition normal.         Judgment: Judgment normal.         Current Outpatient Medications   Medication Sig Dispense Refill     alendronate (Fosamax) 70 mg tablet Take 1 tablet (70 mg) by mouth every 7 days. 12 tablet 2    atenolol (Tenormin) 100 mg tablet Take 1 tablet (100 mg) by mouth once daily at bedtime. 90 tablet 3    C/sourcherry/celery/grape seed (TART CHERRY ORAL) Take 1,200 mg by mouth once daily.      calcium carbonate-vitamin D3 500 mg-5 mcg (200 unit) tablet Take 1 tablet by mouth once daily.      ergocalciferol (Vitamin D-2) 1.25 MG (43845 UT) capsule TAKE 1 CAPSULE MONTHLY 12 capsule 0    losartan (Cozaar) 50 mg tablet TAKE 1 TABLET DAILY 90 tablet 3    magnesium oxide (Mag-Ox) 400 mg tablet 1 tablet (400 mg) once daily.      minocycline (Zilxi) 1.5 % foam Apply topically once daily.      semaglutide 0.25 mg or 0.5 mg (2 mg/3 mL) pen injector Inject 0.25 mg under the skin 1 (one) time per week. Increase to 0.5 mg weekly after 2 weeks if you are tolerating it. 3 mL 1    tamoxifen (Nolvadex) 20 mg tablet Take 1 tablet (20 mg total) by mouth once daily. 90 tablet 2    venlafaxine XR (Effexor-XR) 37.5 mg 24 hr capsule Take 1 capsule (37.5 mg) by mouth once daily. 30 capsule 1     No current facility-administered medications for this visit.     LABS:   Lab Results   Component Value Date    ALBUMIN 3.8 07/11/2024    BILITOT 0.9 07/11/2024    ALKPHOS 76 07/11/2024     (H) 07/11/2024     (H) 07/11/2024    PROT 6.4 07/11/2024      Lab Results   Component Value Date    WBC 7.6 07/11/2024    HGB 15.0 07/11/2024    HCT 45.5 07/11/2024    MCV 92 07/11/2024     07/11/2024       === 07/30/24 ===    US ABDOMEN LIMITED LIVER    - Impression -  Hepatic steatosis and hepatomegaly. No focal lesion.  No ductal dilation.    Signed by: Rocco Roy 7/31/2024 6:33 PM  Dictation workstation:   [id number]     Assessment/Plan   Problem List Items Addressed This Visit             ICD-10-CM    Elevated liver enzymes R74.8     Pt has had elevated liver enzymes for a few years, suspected to be related to fatty liver.  However  more recently these have gone up, despite losing 46 lbs with GLP1.    Full liver disease work-up will be ordered to rule out additional causes including autoimmune liver disease.         Relevant Orders    Liver Elastography (Fibroscan)    Enhanced Liver Fibrosis Score (ELF); [facility]; 680010 - Miscellaneous Test    NIKKI with Reflex to ZOYA    Anti-Mitochondrial Antibody    Anti-Smooth Muscle Antibody    CBC and Auto Differential    Ceruloplasmin    Comprehensive Metabolic Panel    Iron and TIBC    Ferritin    Immunoglobulins (IgG, IgA, IgM)    Liver/Kidney Microsome Type 1 Antibodies, Serum    Soluble Liver Ag Abs    Non-alcoholic fatty liver disease - Primary K76.0     Discussed natural history of fatty liver including risk factors and management.  Exercise/Activity  Vigorous physical activity like brisk walking or structured gym exercises 3 times a week for 30 minutes at minimum.    Resistance training to build muscle mass which will aid in weight loss.  Swimming, water aerobics are excellent forms of exercises that are easy on joints.    Exercise for 30 minutes or more at least 3 times a week  Aim to lose 7-10% of your total body weight over 6-12 months  Diet  Avoid/Limit simple carbs including simple sugars  Avoid eating foods that are rich in sugar in excess such as high sugar content fruits (pineapple, cecelia...) and desserts, cakes and pies  Eat more good foods that are rich in good fat such as cold water fish (salmon, swordfish...) as well as avocados and peanut butter in moderation  Snack on nuts that are high in protein and good oils such as walnuts, almonds.   Eat a mediteranean diet which is rich in grilled lean meats, high protein beans and legumes and olive oil.          Will complete fibroscan to grade and stage fatty liver-will wait until November when she is further in her recovery from shoulder fracture.  Based on these findings, will determine follow-up and treatment course, which could include new  drug for VILLEGAS.                      Beba Murdock, JOSE CARLOS-CNP 09/05/24 1:24 PM

## 2024-09-05 NOTE — ASSESSMENT & PLAN NOTE
Discussed natural history of fatty liver including risk factors and management.  Exercise/Activity  Vigorous physical activity like brisk walking or structured gym exercises 3 times a week for 30 minutes at minimum.    Resistance training to build muscle mass which will aid in weight loss.  Swimming, water aerobics are excellent forms of exercises that are easy on joints.    Exercise for 30 minutes or more at least 3 times a week  Aim to lose 7-10% of your total body weight over 6-12 months  Diet  Avoid/Limit simple carbs including simple sugars  Avoid eating foods that are rich in sugar in excess such as high sugar content fruits (pineapple, cecelia...) and desserts, cakes and pies  Eat more good foods that are rich in good fat such as cold water fish (salmon, swordfish...) as well as avocados and peanut butter in moderation  Snack on nuts that are high in protein and good oils such as walnuts, almonds.   Eat a mediteranean diet which is rich in grilled lean meats, high protein beans and legumes and olive oil.          Will complete fibroscan to grade and stage fatty liver-will wait until November when she is further in her recovery from shoulder fracture.  Based on these findings, will determine follow-up and treatment course, which could include new drug for VILLEGAS.

## 2024-09-06 LAB
ANA SER QL HEP2 SUBST: NEGATIVE
MITOCHONDRIA AB SER QL IF: NEGATIVE

## 2024-09-07 LAB — LKM AB TITR SER IF: NORMAL {TITER}

## 2024-09-08 LAB — SOLUBLE LIVER IGG SER IA-ACNC: 4.2 U (ref 0–24.9)

## 2024-09-09 LAB — SMOOTH MUSCLE AB SER QL IF: ABNORMAL

## 2024-09-10 LAB — SCAN RESULT: ABNORMAL

## 2024-09-16 ENCOUNTER — HOSPITAL ENCOUNTER (OUTPATIENT)
Dept: RADIOLOGY | Facility: CLINIC | Age: 59
Discharge: HOME | End: 2024-09-16
Payer: COMMERCIAL

## 2024-09-16 ENCOUNTER — OFFICE VISIT (OUTPATIENT)
Dept: ORTHOPEDIC SURGERY | Facility: CLINIC | Age: 59
End: 2024-09-16
Payer: COMMERCIAL

## 2024-09-16 DIAGNOSIS — S42.295A OTHER CLOSED NONDISPLACED FRACTURE OF PROXIMAL END OF LEFT HUMERUS, INITIAL ENCOUNTER: ICD-10-CM

## 2024-09-16 PROCEDURE — 99211 OFF/OP EST MAY X REQ PHY/QHP: CPT | Performed by: ORTHOPAEDIC SURGERY

## 2024-09-16 PROCEDURE — 73030 X-RAY EXAM OF SHOULDER: CPT | Mod: LT

## 2024-09-16 PROCEDURE — 1036F TOBACCO NON-USER: CPT | Performed by: ORTHOPAEDIC SURGERY

## 2024-09-16 PROCEDURE — 73030 X-RAY EXAM OF SHOULDER: CPT | Mod: LEFT SIDE | Performed by: ORTHOPAEDIC SURGERY

## 2024-09-16 PROCEDURE — 99024 POSTOP FOLLOW-UP VISIT: CPT | Performed by: ORTHOPAEDIC SURGERY

## 2024-09-16 NOTE — PROGRESS NOTES
History: Melissa is here for her left shoulder.  She is 4 weeks out from a proximal humerus fracture.  She feels like the pain has been slowly improving over the last week.  She does not use her sling but uses a belt around her neck for support.    Physical Exam: The skin is intact throughout.  Swelling and ecchymosis are improving.  She has fairly decent passive external rotation to 40 degrees external rotation at the side today.  Elbow and hand are moving well.  She denies any numbness or tingling distally.    Radiographs: 2 view left shoulder x-rays show no change in positioning of her proximal humerus fracture.  With impaction and valgus angulation again noted. No significant callus formation noted.     Assessment: Stable left proximal humerus fracture, 4 weeks out    Plan: We discussed continuing with the sling for at least another 2 weeks.  She can get started in a formal therapy program doing full passive range of motion for 2 weeks followed by full passive and active range of motion with no weights.  Beginning in 2 weeks she can slowly wean from the sling.  She can continue with an icing regimen and her Motrin as needed.  We will see her back in another 4 weeks to assess progress with 2 view left shoulder x-rays.  All questions were answered the patient.

## 2024-09-23 ENCOUNTER — TELEPHONE (OUTPATIENT)
Dept: HEMATOLOGY/ONCOLOGY | Facility: CLINIC | Age: 59
End: 2024-09-23
Payer: COMMERCIAL

## 2024-09-23 DIAGNOSIS — C50.211 MALIGNANT NEOPLASM OF UPPER-INNER QUADRANT OF RIGHT BREAST IN FEMALE, ESTROGEN RECEPTOR POSITIVE: ICD-10-CM

## 2024-09-23 DIAGNOSIS — Z17.0 MALIGNANT NEOPLASM OF UPPER-INNER QUADRANT OF RIGHT BREAST IN FEMALE, ESTROGEN RECEPTOR POSITIVE: ICD-10-CM

## 2024-09-23 RX ORDER — ALENDRONATE SODIUM 70 MG/1
70 TABLET ORAL
Qty: 12 TABLET | Refills: 2 | Status: SHIPPED | OUTPATIENT
Start: 2024-09-23

## 2024-09-23 NOTE — TELEPHONE ENCOUNTER
Spoke with the patient. Aware refill request will be sent in. Pt verbalized understanding and had no further questions or concerns at this time.

## 2024-09-24 ENCOUNTER — OFFICE VISIT (OUTPATIENT)
Dept: GASTROENTEROLOGY | Facility: HOSPITAL | Age: 59
End: 2024-09-24
Payer: COMMERCIAL

## 2024-09-24 DIAGNOSIS — R74.8 ELEVATED LIVER ENZYMES: Primary | ICD-10-CM

## 2024-09-24 PROCEDURE — 99214 OFFICE O/P EST MOD 30 MIN: CPT | Performed by: NURSE PRACTITIONER

## 2024-09-24 ASSESSMENT — ENCOUNTER SYMPTOMS
TROUBLE SWALLOWING: 0
CHILLS: 0
CONFUSION: 0
ABDOMINAL DISTENTION: 0
SLEEP DISTURBANCE: 0
VOICE CHANGE: 0
LIGHT-HEADEDNESS: 0
DYSURIA: 0
SHORTNESS OF BREATH: 0
FREQUENCY: 0
DIFFICULTY URINATING: 0
WEAKNESS: 0
NAUSEA: 0
TREMORS: 0
CONSTIPATION: 0
AGITATION: 0
COUGH: 0
WHEEZING: 0
DIARRHEA: 0
HEMATURIA: 0
BLOOD IN STOOL: 0
FEVER: 0
BRUISES/BLEEDS EASILY: 0
DIZZINESS: 0
APPETITE CHANGE: 0
HEADACHES: 0
NUMBNESS: 0
VOMITING: 0
UNEXPECTED WEIGHT CHANGE: 0
JOINT SWELLING: 1
COLOR CHANGE: 0
ABDOMINAL PAIN: 0
PALPITATIONS: 0

## 2024-09-24 NOTE — PROGRESS NOTES
Patient ID: Melissa Bardales is a 59 y.o. female who presents for elevated liver enzymes.  FUV to review results.    9/24/2024:  FU for lab review.  Liver enzymes are much higher than in July.   (137),  (144), Alk Phos 160 (76).  Liver disease work-up essentially negative for autoimmune or hereditary disease.  ELF score high concerning for risk of disease progression or cirrhosis.  She is still recovering from fractured left humerus.  She is not taking any new medications, supplements or pain medicine.  Rare NSAID-generally only with PT.  Feels well; no jaundice, icterus or other signs of liver dysfunction  She is on tamoxifen which she has been on for 3 years.    ---------------------------------------------------------------------------    HPI  59 year old with history of obesity, depression, breast cancer (on tamoxifen) and HTN referred for elevated liver enzymes.  She has known about having fatty liver for a few years with elevated liver enzymes and imaging of hepatic steatosis.    She has been on semaglutide since March and is down 46 lbs since then.  However, her liver enzymes have gone up into the mid 100s since then.  She has a history of taking meloxicam for chronic pain but stopped that 5 weeks ago.    She does not consume ETOH or have a history of drug use.  Pt has 2 children with autoimmune disease-one of which is Crohns.  Recently had fall 2 weeks ago and fractured her left humerus.  She is in a sling right now.    Denies jaundice, icterus, itching, abdominal distension, edema, bleeding or confusion.    Denies fevers, chills, abdominal pain.       Review of Systems   Constitutional:  Negative for appetite change, chills, fever and unexpected weight change.   HENT:  Negative for mouth sores, nosebleeds, trouble swallowing and voice change.    Eyes:  Negative for visual disturbance.   Respiratory:  Negative for cough, shortness of breath and wheezing.    Cardiovascular:  Negative for  chest pain, palpitations and leg swelling.   Gastrointestinal:  Negative for abdominal distention, abdominal pain, blood in stool, constipation, diarrhea, nausea and vomiting.   Genitourinary:  Negative for decreased urine volume, difficulty urinating, dysuria, frequency, hematuria and urgency.   Musculoskeletal:  Positive for joint swelling. Negative for gait problem.   Skin:  Negative for color change, pallor and rash.   Neurological:  Negative for dizziness, tremors, weakness, light-headedness, numbness and headaches.   Hematological:  Does not bruise/bleed easily.   Psychiatric/Behavioral:  Negative for agitation, behavioral problems, confusion and sleep disturbance.        Objective   Physical Exam  Constitutional:       General: She is awake.      Appearance: Normal appearance. She is well-developed. She is obese.   HENT:      Head: Normocephalic and atraumatic.      Right Ear: Hearing normal.      Left Ear: Hearing normal.      Nose: Nose normal.      Mouth/Throat:      Lips: Pink.      Mouth: Mucous membranes are moist.   Eyes:      General: Lids are normal.      Extraocular Movements: Extraocular movements intact.      Conjunctiva/sclera: Conjunctivae normal.      Pupils: Pupils are equal, round, and reactive to light.   Cardiovascular:      Rate and Rhythm: Normal rate and regular rhythm.      Pulses: Normal pulses.      Heart sounds: Normal heart sounds.   Pulmonary:      Effort: Pulmonary effort is normal.      Breath sounds: Normal breath sounds.   Abdominal:      General: Abdomen is flat. Bowel sounds are normal.      Palpations: Abdomen is soft.   Musculoskeletal:      Cervical back: Normal range of motion and neck supple.      Comments: Left shoulder in sling from fracture   Feet:      Right foot:      Skin integrity: Skin integrity normal.      Left foot:      Skin integrity: Skin integrity normal.   Skin:     General: Skin is warm.   Neurological:      General: No focal deficit present.      Mental  Status: She is alert and oriented to person, place, and time.      Cranial Nerves: Cranial nerves 2-12 are intact.      Sensory: Sensation is intact.      Motor: Motor function is intact.      Coordination: Coordination is intact.      Gait: Gait is intact.   Psychiatric:         Attention and Perception: Attention and perception normal.         Mood and Affect: Mood normal.         Speech: Speech normal.         Behavior: Behavior is cooperative.         Thought Content: Thought content normal.         Cognition and Memory: Cognition normal.         Judgment: Judgment normal.         Current Outpatient Medications   Medication Sig Dispense Refill    alendronate (Fosamax) 70 mg tablet Take 1 tablet (70 mg) by mouth every 7 days. 12 tablet 2    atenolol (Tenormin) 100 mg tablet Take 1 tablet (100 mg) by mouth once daily at bedtime. 90 tablet 3    C/sourcherry/celery/grape seed (TART CHERRY ORAL) Take 1,200 mg by mouth once daily.      calcium carbonate-vitamin D3 500 mg-5 mcg (200 unit) tablet Take 1 tablet by mouth once daily.      ergocalciferol (Vitamin D-2) 1.25 MG (48660 UT) capsule TAKE 1 CAPSULE MONTHLY 12 capsule 0    losartan (Cozaar) 50 mg tablet TAKE 1 TABLET DAILY 90 tablet 3    magnesium oxide (Mag-Ox) 400 mg tablet 1 tablet (400 mg) once daily.      minocycline (Zilxi) 1.5 % foam Apply topically once daily.      semaglutide 0.25 mg or 0.5 mg (2 mg/3 mL) pen injector Inject 0.25 mg under the skin 1 (one) time per week. Increase to 0.5 mg weekly after 2 weeks if you are tolerating it. 3 mL 1    tamoxifen (Nolvadex) 20 mg tablet Take 1 tablet (20 mg total) by mouth once daily. 90 tablet 2    venlafaxine XR (Effexor-XR) 37.5 mg 24 hr capsule Take 1 capsule (37.5 mg) by mouth once daily. 30 capsule 1     No current facility-administered medications for this visit.     LABS:   Lab Results   Component Value Date    ALBUMIN 3.8 09/05/2024    BILITOT 0.8 09/05/2024    ALKPHOS 160 (H) 09/05/2024     (H)  09/05/2024     (H) 09/05/2024    PROT 6.8 09/05/2024      Lab Results   Component Value Date    WBC 10.1 09/05/2024    HGB 15.0 09/05/2024    HCT 45.4 09/05/2024    MCV 93 09/05/2024     09/05/2024       === 07/30/24 ===    US ABDOMEN LIMITED LIVER    - Impression -  Hepatic steatosis and hepatomegaly. No focal lesion.  No ductal dilation.    Signed by: Rocco Roy 7/31/2024 6:33 PM  Dictation workstation:   [id number]     Assessment/Plan   Problem List Items Addressed This Visit             ICD-10-CM    Elevated liver enzymes - Primary R74.8     Elevated liver enzymes in the setting of obesity, HTN and recent injury to arm.  No evidence of liver dysfunction.  Unclear etiology and suspect this is either related to recent injury, pain meds previously taking or possibly viral illness.  Will repeat labs in 2-3 weeks and if they are not improved, will determine next course of action.          Relevant Orders    CBC and Auto Differential    Comprehensive Metabolic Panel              JOSE CARLOS Mckeon-CNP 10/04/24 9:17 AM

## 2024-10-04 NOTE — ASSESSMENT & PLAN NOTE
Elevated liver enzymes in the setting of obesity, HTN and recent injury to arm.  No evidence of liver dysfunction.  Unclear etiology and suspect this is either related to recent injury, pain meds previously taking or possibly viral illness.  Will repeat labs in 2-3 weeks and if they are not improved, will determine next course of action.

## 2024-10-14 ENCOUNTER — HOSPITAL ENCOUNTER (OUTPATIENT)
Dept: RADIOLOGY | Facility: CLINIC | Age: 59
Discharge: HOME | End: 2024-10-14
Payer: COMMERCIAL

## 2024-10-14 ENCOUNTER — LAB (OUTPATIENT)
Dept: LAB | Facility: LAB | Age: 59
End: 2024-10-14

## 2024-10-14 ENCOUNTER — OFFICE VISIT (OUTPATIENT)
Dept: ORTHOPEDIC SURGERY | Facility: CLINIC | Age: 59
End: 2024-10-14
Payer: COMMERCIAL

## 2024-10-14 DIAGNOSIS — S42.295A OTHER CLOSED NONDISPLACED FRACTURE OF PROXIMAL END OF LEFT HUMERUS, INITIAL ENCOUNTER: ICD-10-CM

## 2024-10-14 DIAGNOSIS — R74.8 ELEVATED LIVER ENZYMES: ICD-10-CM

## 2024-10-14 LAB
ALBUMIN SERPL BCP-MCNC: 3.7 G/DL (ref 3.4–5)
ALP SERPL-CCNC: 88 U/L (ref 33–110)
ALT SERPL W P-5'-P-CCNC: 151 U/L (ref 7–45)
ANION GAP SERPL CALC-SCNC: 10 MMOL/L (ref 10–20)
AST SERPL W P-5'-P-CCNC: 111 U/L (ref 9–39)
BASOPHILS # BLD AUTO: 0.07 X10*3/UL (ref 0–0.1)
BASOPHILS NFR BLD AUTO: 1 %
BILIRUB SERPL-MCNC: 0.7 MG/DL (ref 0–1.2)
BUN SERPL-MCNC: 12 MG/DL (ref 6–23)
CALCIUM SERPL-MCNC: 9.3 MG/DL (ref 8.6–10.3)
CHLORIDE SERPL-SCNC: 105 MMOL/L (ref 98–107)
CO2 SERPL-SCNC: 28 MMOL/L (ref 21–32)
CREAT SERPL-MCNC: 0.73 MG/DL (ref 0.5–1.05)
EGFRCR SERPLBLD CKD-EPI 2021: >90 ML/MIN/1.73M*2
EOSINOPHIL # BLD AUTO: 0.26 X10*3/UL (ref 0–0.7)
EOSINOPHIL NFR BLD AUTO: 3.6 %
ERYTHROCYTE [DISTWIDTH] IN BLOOD BY AUTOMATED COUNT: 12.4 % (ref 11.5–14.5)
GLUCOSE SERPL-MCNC: 77 MG/DL (ref 74–99)
HCT VFR BLD AUTO: 44.1 % (ref 36–46)
HGB BLD-MCNC: 14.4 G/DL (ref 12–16)
IMM GRANULOCYTES # BLD AUTO: 0.02 X10*3/UL (ref 0–0.7)
IMM GRANULOCYTES NFR BLD AUTO: 0.3 % (ref 0–0.9)
LYMPHOCYTES # BLD AUTO: 1.5 X10*3/UL (ref 1.2–4.8)
LYMPHOCYTES NFR BLD AUTO: 21 %
MCH RBC QN AUTO: 30.4 PG (ref 26–34)
MCHC RBC AUTO-ENTMCNC: 32.7 G/DL (ref 32–36)
MCV RBC AUTO: 93 FL (ref 80–100)
MONOCYTES # BLD AUTO: 0.7 X10*3/UL (ref 0.1–1)
MONOCYTES NFR BLD AUTO: 9.8 %
NEUTROPHILS # BLD AUTO: 4.59 X10*3/UL (ref 1.2–7.7)
NEUTROPHILS NFR BLD AUTO: 64.3 %
NRBC BLD-RTO: 0 /100 WBCS (ref 0–0)
PLATELET # BLD AUTO: 219 X10*3/UL (ref 150–450)
POTASSIUM SERPL-SCNC: 4.2 MMOL/L (ref 3.5–5.3)
PROT SERPL-MCNC: 6.3 G/DL (ref 6.4–8.2)
RBC # BLD AUTO: 4.73 X10*6/UL (ref 4–5.2)
SODIUM SERPL-SCNC: 139 MMOL/L (ref 136–145)
WBC # BLD AUTO: 7.1 X10*3/UL (ref 4.4–11.3)

## 2024-10-14 PROCEDURE — 85025 COMPLETE CBC W/AUTO DIFF WBC: CPT

## 2024-10-14 PROCEDURE — 99211 OFF/OP EST MAY X REQ PHY/QHP: CPT | Performed by: ORTHOPAEDIC SURGERY

## 2024-10-14 PROCEDURE — 99024 POSTOP FOLLOW-UP VISIT: CPT | Performed by: ORTHOPAEDIC SURGERY

## 2024-10-14 PROCEDURE — 73030 X-RAY EXAM OF SHOULDER: CPT | Mod: LT

## 2024-10-14 PROCEDURE — 36415 COLL VENOUS BLD VENIPUNCTURE: CPT

## 2024-10-14 PROCEDURE — 73030 X-RAY EXAM OF SHOULDER: CPT | Mod: LEFT SIDE | Performed by: ORTHOPAEDIC SURGERY

## 2024-10-14 PROCEDURE — 80053 COMPREHEN METABOLIC PANEL: CPT

## 2024-10-14 NOTE — PROGRESS NOTES
History: Melissa is here for her left shoulder.  She is 8 weeks out from a proximal humerus fracture.  She has been doing therapy.  Her pain is improving.    Physical Exam: Her skin is intact throughout.  She has tightness beyond 20 degrees of external rotation today.  She has a hard time holding the arm at 90 degrees of abduction but can do so around 60 degrees.  Elbow and hand move well.  She denies any numbness or tingling.    Radiographs: Left shoulder x-rays show no change in positioning of her proximal humerus fracture with interval callus formation noted.    Assessment: Healing left proximal humerus fracture, 8 weeks out    Plan: She is starting to get some healing.  We discussed continuing to progress with her therapy and she can do full passive and active range of motion with no weight greater than 1 pound.  She can continue with an icing regimen.  We will see her back in 5 to 6 weeks to assess progress with 2 view left shoulder x-rays.  All questions were answered with the patient.

## 2024-10-30 DIAGNOSIS — I10 PRIMARY HYPERTENSION: ICD-10-CM

## 2024-10-31 RX ORDER — ATENOLOL 100 MG/1
100 TABLET ORAL NIGHTLY
Qty: 90 TABLET | Refills: 3 | Status: SHIPPED | OUTPATIENT
Start: 2024-10-31

## 2024-11-04 ENCOUNTER — CLINICAL SUPPORT (OUTPATIENT)
Dept: GASTROENTEROLOGY | Facility: CLINIC | Age: 59
End: 2024-11-04
Payer: COMMERCIAL

## 2024-11-04 DIAGNOSIS — R74.8 ELEVATED LIVER ENZYMES: ICD-10-CM

## 2024-11-04 PROCEDURE — 91200 LIVER ELASTOGRAPHY: CPT | Performed by: INTERNAL MEDICINE

## 2024-11-15 DIAGNOSIS — C50.211 MALIGNANT NEOPLASM OF UPPER-INNER QUADRANT OF RIGHT BREAST IN FEMALE, ESTROGEN RECEPTOR POSITIVE: ICD-10-CM

## 2024-11-15 DIAGNOSIS — E88.819 INSULIN RESISTANCE: ICD-10-CM

## 2024-11-15 DIAGNOSIS — Z17.0 MALIGNANT NEOPLASM OF UPPER-INNER QUADRANT OF RIGHT BREAST IN FEMALE, ESTROGEN RECEPTOR POSITIVE: ICD-10-CM

## 2024-11-15 RX ORDER — TAMOXIFEN CITRATE 20 MG/1
20 TABLET ORAL DAILY
Qty: 90 TABLET | Refills: 2 | Status: CANCELLED | OUTPATIENT
Start: 2024-11-15

## 2024-11-15 NOTE — TELEPHONE ENCOUNTER
Patient lm that she has been going thru [facility] for the semaglutide.  She spoke with her insurance and they told her it is covered now.   She asked if you could send in a refill  She said it will probably need a PA

## 2024-11-18 ENCOUNTER — TELEPHONE (OUTPATIENT)
Dept: PRIMARY CARE | Facility: CLINIC | Age: 59
End: 2024-11-18
Payer: COMMERCIAL

## 2024-11-18 NOTE — PROGRESS NOTES
History: Melissa is here for her left shoulder.  She is nearly 3 months out from a comminuted proximal humerus fracture.  She has not had any pain and is making progress with her therapy.  Overall she is doing very well.    Physical Exam: She has forward flexion and abduction to about 100 degrees.  She has 5-5 abduction strength and 4+ out of 5 supraspinatus strength.  There is tightness to 40 degrees of external rotation.  Internal rotation is limited to the side.  Skin is clear.  No numbness or tingling.    Radiographs: AP and lateral views of the left shoulder show continued healing of her proximal humerus fracture with interval callus ration noted.    Assessment: Healing left proximal humerus fracture, 3 months out    Plan: She is doing very well and has not had much pain.  We discussed continuing to maximize her therapy and she can do full passive and active range of motion.  She can gradually begin strengthening as tolerated.  We would be happy to see her back over the next couple months if having any continued pain or weakness, but if doing well she can follow-up as needed.  All questions were answered the patient.

## 2024-11-18 NOTE — TELEPHONE ENCOUNTER
Patient lm today checking on the status of the msg from Friday that she left about sending semaglutide to pharmacy after insurance telling her that it is covered

## 2024-11-20 ENCOUNTER — OFFICE VISIT (OUTPATIENT)
Dept: ORTHOPEDIC SURGERY | Facility: CLINIC | Age: 59
End: 2024-11-20
Payer: COMMERCIAL

## 2024-11-20 ENCOUNTER — HOSPITAL ENCOUNTER (OUTPATIENT)
Dept: RADIOLOGY | Facility: CLINIC | Age: 59
Discharge: HOME | End: 2024-11-20
Payer: COMMERCIAL

## 2024-11-20 DIAGNOSIS — S42.295A OTHER CLOSED NONDISPLACED FRACTURE OF PROXIMAL END OF LEFT HUMERUS, INITIAL ENCOUNTER: ICD-10-CM

## 2024-11-20 DIAGNOSIS — R74.8 ELEVATED LIVER ENZYMES: Primary | ICD-10-CM

## 2024-11-20 PROCEDURE — 73030 X-RAY EXAM OF SHOULDER: CPT | Mod: LT

## 2024-11-20 PROCEDURE — 99211 OFF/OP EST MAY X REQ PHY/QHP: CPT | Performed by: ORTHOPAEDIC SURGERY

## 2024-11-20 PROCEDURE — 73030 X-RAY EXAM OF SHOULDER: CPT | Mod: LEFT SIDE | Performed by: ORTHOPAEDIC SURGERY

## 2024-11-20 PROCEDURE — 99213 OFFICE O/P EST LOW 20 MIN: CPT | Performed by: ORTHOPAEDIC SURGERY

## 2024-11-20 PROCEDURE — 1036F TOBACCO NON-USER: CPT | Performed by: ORTHOPAEDIC SURGERY

## 2024-11-22 ENCOUNTER — TELEPHONE (OUTPATIENT)
Dept: PRIMARY CARE | Facility: CLINIC | Age: 59
End: 2024-11-22
Payer: COMMERCIAL

## 2024-11-22 NOTE — TELEPHONE ENCOUNTER
Patient lm asking if you could send a refill to [facility] until we get the outcome thru her insurance.  There was a fax for more info needed on the PA.  She also said you could add her being on cpap might help

## 2024-12-02 ENCOUNTER — TELEPHONE (OUTPATIENT)
Dept: HEMATOLOGY/ONCOLOGY | Facility: CLINIC | Age: 59
End: 2024-12-02
Payer: COMMERCIAL

## 2024-12-02 NOTE — TELEPHONE ENCOUNTER
Spoke with the patient. Did discuss that team sent in refill in Sept 2024. Pt was not aware and will call pharmacy to get refill shipped to her today. Encouraged to call office back if there are any further questions or concerns,

## 2024-12-03 ENCOUNTER — APPOINTMENT (OUTPATIENT)
Dept: PRIMARY CARE | Facility: CLINIC | Age: 59
End: 2024-12-03
Payer: COMMERCIAL

## 2024-12-03 VITALS
HEART RATE: 73 BPM | TEMPERATURE: 97.5 F | SYSTOLIC BLOOD PRESSURE: 140 MMHG | DIASTOLIC BLOOD PRESSURE: 86 MMHG | WEIGHT: 277.25 LBS | BODY MASS INDEX: 44.75 KG/M2

## 2024-12-03 DIAGNOSIS — E66.813 CLASS 3 SEVERE OBESITY DUE TO EXCESS CALORIES WITHOUT SERIOUS COMORBIDITY WITH BODY MASS INDEX (BMI) OF 50.0 TO 59.9 IN ADULT: Primary | ICD-10-CM

## 2024-12-03 DIAGNOSIS — Z87.81 HISTORY OF HUMERUS FRACTURE: ICD-10-CM

## 2024-12-03 DIAGNOSIS — E66.01 CLASS 3 SEVERE OBESITY DUE TO EXCESS CALORIES WITHOUT SERIOUS COMORBIDITY WITH BODY MASS INDEX (BMI) OF 50.0 TO 59.9 IN ADULT: Primary | ICD-10-CM

## 2024-12-03 DIAGNOSIS — E55.9 VITAMIN D DEFICIENCY: ICD-10-CM

## 2024-12-03 DIAGNOSIS — K76.0 NON-ALCOHOLIC FATTY LIVER DISEASE: ICD-10-CM

## 2024-12-03 PROCEDURE — 3077F SYST BP >= 140 MM HG: CPT | Performed by: FAMILY MEDICINE

## 2024-12-03 PROCEDURE — 1036F TOBACCO NON-USER: CPT | Performed by: FAMILY MEDICINE

## 2024-12-03 PROCEDURE — 3079F DIAST BP 80-89 MM HG: CPT | Performed by: FAMILY MEDICINE

## 2024-12-03 PROCEDURE — 99215 OFFICE O/P EST HI 40 MIN: CPT | Performed by: FAMILY MEDICINE

## 2024-12-03 RX ORDER — ERGOCALCIFEROL 1.25 MG/1
50000 CAPSULE ORAL WEEKLY
Qty: 12 CAPSULE | Refills: 3 | Status: SHIPPED | OUTPATIENT
Start: 2024-12-03

## 2024-12-03 RX ORDER — KETOCONAZOLE 20 MG/ML
SHAMPOO, SUSPENSION TOPICAL
COMMUNITY
Start: 2024-12-03 | End: 2024-12-03

## 2024-12-03 NOTE — ASSESSMENT & PLAN NOTE
She is doing great on the semaglutide.  Has had a >55# weight loss since starting the med.  She is having difficulty with the cost.  She has called her insurance and was told it was covered.  We sent a prior auth that was denied.  I called the number on the letter to talk to a physician or pharmacist and spent 30 minutes on the phone and never got to speak to one.  We started an appeal process for any GLP-1.    Orders:    Follow Up In [facility] - PCP - Established    Follow Up In [facility] - PCP - Established; Future

## 2024-12-03 NOTE — PROGRESS NOTES
Subjective   Melissa Bardales is a 59 y.o. female who presents for Follow-up (3mon check).    Here for a follow up Has had a difficult couple months.    Tripped over her grandchild's toy and broke her proximal humerus.    She was in a sling for 6 weeks.  Did not require surgery.      She has been seeing GI-NP for elevated liver functions.  They did a fibroscan and it said she had Stage 4 cirrhosis and she was sent a video about liver transplantation without anyone speaking with her.    After she spoke with GI, they reassured her that they think this was not a correct value.  She is continuing to work with them      Following up on her semaglutide.  She is doing very well and has lost another 15# since her most recent visit .             Review of Systems    Objective   /86   Pulse 73   Temp 36.4 °C (97.5 °F)   Wt 126 kg (277 lb 4 oz)   BMI 44.75 kg/m²     Physical Exam  HENT:      Head: Normocephalic and atraumatic.      Mouth/Throat:      Mouth: Mucous membranes are moist.      Pharynx: Oropharynx is clear.   Eyes:      Extraocular Movements: Extraocular movements intact.      Pupils: Pupils are equal, round, and reactive to light.   Cardiovascular:      Rate and Rhythm: Normal rate and regular rhythm.      Heart sounds: Normal heart sounds.   Pulmonary:      Effort: Pulmonary effort is normal.      Breath sounds: Normal breath sounds.   Musculoskeletal:         General: Normal range of motion.      Cervical back: Normal range of motion.   Lymphadenopathy:      Cervical: No cervical adenopathy.   Skin:     General: Skin is warm and dry.   Neurological:      General: No focal deficit present.      Mental Status: She is alert.   Psychiatric:         Mood and Affect: Mood normal.         Assessment/Plan   Assessment & Plan  Class 3 severe obesity due to excess calories without serious comorbidity with body mass index (BMI) of 50.0 to 59.9 in adult  She is doing great on the semaglutide.  Has had a >55#  weight loss since starting the med.  She is having difficulty with the cost.  She has called her insurance and was told it was covered.  We sent a prior auth that was denied.  I called the number on the letter to talk to a physician or pharmacist and spent 30 minutes on the phone and never got to speak to one.  We started an appeal process for any GLP-1.    Orders:    Follow Up In [facility] - PCP - Established    Follow Up In [facility] - PCP - Established; Future    Vitamin D deficiency    Orders:    ergocalciferol (Vitamin D-2) 1.25 MG (28397 UT) capsule; Take 1 capsule (50,000 Units) by mouth 1 (one) time per week.    Non-alcoholic fatty liver disease  Continue weight loss.  Avoid fatty foods.  She does not drink alcohol.  Continue follow up with GI.         History of humerus fracture  She is healing well.  I advised her to keep up with the PT so she does not develop a frozen shoulder.               There are no Patient Instructions on file for this visit.

## 2024-12-03 NOTE — ASSESSMENT & PLAN NOTE
Orders:    ergocalciferol (Vitamin D-2) 1.25 MG (27430 UT) capsule; Take 1 capsule (50,000 Units) by mouth 1 (one) time per week.

## 2024-12-03 NOTE — ASSESSMENT & PLAN NOTE
She is healing well.  I advised her to keep up with the PT so she does not develop a frozen shoulder.

## 2024-12-03 NOTE — ASSESSMENT & PLAN NOTE
Continue weight loss.  Avoid fatty foods.  She does not drink alcohol.  Continue follow up with GI.

## 2024-12-17 ENCOUNTER — LAB (OUTPATIENT)
Dept: LAB | Facility: LAB | Age: 59
End: 2024-12-17
Payer: COMMERCIAL

## 2024-12-17 DIAGNOSIS — R74.8 ELEVATED LIVER ENZYMES: ICD-10-CM

## 2024-12-17 LAB
ALBUMIN SERPL BCP-MCNC: 3.8 G/DL (ref 3.4–5)
ALP SERPL-CCNC: 74 U/L (ref 33–110)
ALT SERPL W P-5'-P-CCNC: 59 U/L (ref 7–45)
ANION GAP SERPL CALC-SCNC: 10 MMOL/L (ref 10–20)
AST SERPL W P-5'-P-CCNC: 64 U/L (ref 9–39)
BASOPHILS # BLD AUTO: 0.06 X10*3/UL (ref 0–0.1)
BASOPHILS NFR BLD AUTO: 0.8 %
BILIRUB SERPL-MCNC: 1 MG/DL (ref 0–1.2)
BUN SERPL-MCNC: 10 MG/DL (ref 6–23)
CALCIUM SERPL-MCNC: 9.1 MG/DL (ref 8.6–10.3)
CHLORIDE SERPL-SCNC: 105 MMOL/L (ref 98–107)
CO2 SERPL-SCNC: 27 MMOL/L (ref 21–32)
CREAT SERPL-MCNC: 0.69 MG/DL (ref 0.5–1.05)
EGFRCR SERPLBLD CKD-EPI 2021: >90 ML/MIN/1.73M*2
EOSINOPHIL # BLD AUTO: 0.27 X10*3/UL (ref 0–0.7)
EOSINOPHIL NFR BLD AUTO: 3.8 %
ERYTHROCYTE [DISTWIDTH] IN BLOOD BY AUTOMATED COUNT: 13.1 % (ref 11.5–14.5)
GLUCOSE SERPL-MCNC: 83 MG/DL (ref 74–99)
HCT VFR BLD AUTO: 43.9 % (ref 36–46)
HGB BLD-MCNC: 14.4 G/DL (ref 12–16)
IMM GRANULOCYTES # BLD AUTO: 0.01 X10*3/UL (ref 0–0.7)
IMM GRANULOCYTES NFR BLD AUTO: 0.1 % (ref 0–0.9)
LYMPHOCYTES # BLD AUTO: 1.87 X10*3/UL (ref 1.2–4.8)
LYMPHOCYTES NFR BLD AUTO: 26.2 %
MCH RBC QN AUTO: 30.4 PG (ref 26–34)
MCHC RBC AUTO-ENTMCNC: 32.8 G/DL (ref 32–36)
MCV RBC AUTO: 93 FL (ref 80–100)
MONOCYTES # BLD AUTO: 0.64 X10*3/UL (ref 0.1–1)
MONOCYTES NFR BLD AUTO: 9 %
NEUTROPHILS # BLD AUTO: 4.3 X10*3/UL (ref 1.2–7.7)
NEUTROPHILS NFR BLD AUTO: 60.1 %
NRBC BLD-RTO: 0 /100 WBCS (ref 0–0)
PLATELET # BLD AUTO: 198 X10*3/UL (ref 150–450)
POTASSIUM SERPL-SCNC: 4.3 MMOL/L (ref 3.5–5.3)
PROT SERPL-MCNC: 6.5 G/DL (ref 6.4–8.2)
RBC # BLD AUTO: 4.74 X10*6/UL (ref 4–5.2)
SODIUM SERPL-SCNC: 138 MMOL/L (ref 136–145)
WBC # BLD AUTO: 7.2 X10*3/UL (ref 4.4–11.3)

## 2024-12-17 PROCEDURE — 80053 COMPREHEN METABOLIC PANEL: CPT

## 2024-12-17 PROCEDURE — 85025 COMPLETE CBC W/AUTO DIFF WBC: CPT

## 2024-12-17 PROCEDURE — 36415 COLL VENOUS BLD VENIPUNCTURE: CPT

## 2025-01-02 ENCOUNTER — TELEPHONE (OUTPATIENT)
Dept: HEMATOLOGY/ONCOLOGY | Facility: CLINIC | Age: 60
End: 2025-01-02
Payer: COMMERCIAL

## 2025-01-10 ENCOUNTER — OFFICE VISIT (OUTPATIENT)
Dept: SURGICAL ONCOLOGY | Facility: HOSPITAL | Age: 60
End: 2025-01-10
Payer: COMMERCIAL

## 2025-01-10 ENCOUNTER — HOSPITAL ENCOUNTER (OUTPATIENT)
Dept: RADIOLOGY | Facility: HOSPITAL | Age: 60
Discharge: HOME | End: 2025-01-10
Payer: COMMERCIAL

## 2025-01-10 VITALS
DIASTOLIC BLOOD PRESSURE: 119 MMHG | HEART RATE: 79 BPM | SYSTOLIC BLOOD PRESSURE: 206 MMHG | HEIGHT: 65 IN | BODY MASS INDEX: 46.15 KG/M2 | WEIGHT: 277 LBS

## 2025-01-10 DIAGNOSIS — Z85.3 ENCOUNTER FOR FOLLOW-UP SURVEILLANCE OF BREAST CANCER: Primary | ICD-10-CM

## 2025-01-10 DIAGNOSIS — Z17.0 MALIGNANT NEOPLASM OF UPPER-INNER QUADRANT OF RIGHT BREAST IN FEMALE, ESTROGEN RECEPTOR POSITIVE: ICD-10-CM

## 2025-01-10 DIAGNOSIS — C50.211 MALIGNANT NEOPLASM OF UPPER-INNER QUADRANT OF RIGHT BREAST IN FEMALE, ESTROGEN RECEPTOR POSITIVE: ICD-10-CM

## 2025-01-10 DIAGNOSIS — Z08 ENCOUNTER FOR FOLLOW-UP SURVEILLANCE OF BREAST CANCER: Primary | ICD-10-CM

## 2025-01-10 PROCEDURE — 99213 OFFICE O/P EST LOW 20 MIN: CPT | Performed by: NURSE PRACTITIONER

## 2025-01-10 PROCEDURE — 3008F BODY MASS INDEX DOCD: CPT | Performed by: NURSE PRACTITIONER

## 2025-01-10 PROCEDURE — 77062 BREAST TOMOSYNTHESIS BI: CPT

## 2025-01-10 PROCEDURE — 3080F DIAST BP >= 90 MM HG: CPT | Performed by: NURSE PRACTITIONER

## 2025-01-10 PROCEDURE — 3077F SYST BP >= 140 MM HG: CPT | Performed by: NURSE PRACTITIONER

## 2025-01-10 PROCEDURE — 1036F TOBACCO NON-USER: CPT | Performed by: NURSE PRACTITIONER

## 2025-01-13 VITALS
WEIGHT: 277 LBS | HEIGHT: 65 IN | DIASTOLIC BLOOD PRESSURE: 100 MMHG | BODY MASS INDEX: 46.15 KG/M2 | HEART RATE: 79 BPM | SYSTOLIC BLOOD PRESSURE: 170 MMHG

## 2025-01-13 NOTE — PROGRESS NOTES
[facility]  Melissa Bardales female   1965 59 y.o.   [id number]      Chief Complaint  Follow up annual mammogram and exam, history right breast cancer.    History Of Present Illness  Melissa Bardales is a very pleasant 59 y.o.  female s/p right mag seed partial mastectomy and sentinel lymph node biopsy (0) on 2021 for invasive ductal carcinoma (IDC), grade 2, ER+95%, MD+95%, HER2- with 1.6 cm, margins negative. She completed whole breast radiation 2021. She was started on Letrozole 2021 and switched to Arimidex and is now taking Tamoxifen and tolerating it well. She has no family history of breast cancer. She presents today for annual mammogram and exam.  Stage IA, pT1cN0    BREAST IMAGIN2024 Bilateral diagnostic mammogram, indicates BI-RADS Category 2.     REPRODUCTIVE HISTORY: menarche age 11, , first birth age, , OCP's, natural menopause, no HRT, scattered fibroglandular tissue    FAMILY CANCER HISTORY:   none     Review of Systems  Constitutional:  Negative for appetite change, fatigue, fever and unexpected weight change.   HENT:  Negative for ear pain, hearing loss, nosebleeds, sore throat and trouble swallowing.    Eyes:  Negative for discharge, itching and visual disturbance.   Breast: As stated in HPI.  Respiratory:  Negative for cough, chest tightness and shortness of breath.    Cardiovascular:  Negative for chest pain, palpitations and leg swelling.   Gastrointestinal:  Negative for abdominal pain, constipation, diarrhea and nausea.   Endocrine: Negative for cold intolerance and heat intolerance.   Genitourinary:  Negative for dysuria, frequency, hematuria, pelvic pain and vaginal bleeding.   Musculoskeletal:  Negative for arthralgias, back pain, gait problem, joint swelling and myalgias.   Skin:  Negative for color change and rash.   Allergic/Immunologic: Negative for environmental allergies and food allergies.   Neurological:  Negative for  dizziness, tremors, speech difficulty, weakness, numbness and headaches.   Hematological:  Does not bruise/bleed easily.   Psychiatric/Behavioral:  Negative for agitation, dysphoric mood and sleep disturbance. The patient is not nervous/anxious.       Past Medical History  She has a past medical history of Allergic (1978), Anemia (1991), Arthritis (2004), Breast cancer (Multi) (Jan., 2021), Depression (2009), Eczema (2018), Fracture, humerus (2024), History of kidney stones, Hypertension, Obesity (??), Personal history of irradiation, Seizures (Multi) (1980), Varicella (1981), and Visual impairment (1973).    Surgical History  She has a past surgical history that includes Other surgical history (09/17/2019); Other surgical history (09/17/2019); Knee surgery; Breast lumpectomy (Right, Jan., 2021); Cholecystectomy (2019?); Tonsillectomy (1970); [place] tooth extraction (1984); Carpal tunnel release (2016?); and Breast biopsy (2021).    Family History  Cancer-related family history is not on file.     Social History  She reports that she has never smoked. She has never used smokeless tobacco. She reports that she does not currently use alcohol. She reports that she does not use drugs.    Allergies  Amlodipine, Codeine, and Lisinopril    Medications  Current Outpatient Medications   Medication Instructions    alendronate (FOSAMAX) 70 mg, oral, Every 7 days    atenolol (TENORMIN) 100 mg, oral, Nightly    C/sourcherry/celery/grape seed (TART CHERRY ORAL) 1,200 mg, Daily    calcium carbonate-vitamin D3 500 mg-5 mcg (200 unit) tablet 1 tablet, Daily    ergocalciferol (VITAMIN D-2) 50,000 Units, oral, Weekly    losartan (Cozaar) 50 mg tablet TAKE 1 TABLET DAILY    magnesium oxide (MAG-OX) 400 mg, Daily    minocycline (Zilxi) 1.5 % foam Daily    semaglutide 0.5 mg, subcutaneous, Once Weekly, Increase to 0.5 mg weekly after 2 weeks if you are tolerating it.    tamoxifen (NOLVADEX) 20 mg, oral, Daily       Last Recorded  Vitals  Vitals:    01/10/25 1406   BP: (!) 170/100   Pulse: 79   Pt nervous and asymptomatic, also taking a decongestant.       Physical Exam  Chest:         Patient is alert and oriented x3 and in a relaxed and appropriate mood. Her gait is steady and hand grasps are equal. Sclera is clear. The breasts are nearly symmetrical. Right breast has a well healed partial circumareolar incision and axillary incision. The tissue is soft without palpable abnormalities, discrete nodules or masses. The skin and nipples appear normal. There is no cervical, supraclavicular or axillary lymphadenopathy. Left axillary sebaceous cyst no s/sx of infection.    Relevant Results and Imaging  BI mammo bilateral diagnostic tomosynthesis 01/10/2025    Narrative  Interpreted By:  Paras Vides,  STUDY:  BI MAMMO BILATERAL DIAGNOSTIC TOMOSYNTHESIS; 1/10/2025 3:07 pm    ACCESSION NUMBER(S):  [id number]    ORDERING CLINICIAN:  HAO CASTANEDA    INDICATION:  Screening.    ,C50.211 Malignant neoplasm of upper-inner quadrant of right female  breast,Z17.0 Estrogen receptor positive status (ER+)    COMPARISON:  01/04/2024, 12/22/2022    FINDINGS:  2D and tomosynthesis images were reviewed at 1 mm slice thickness.    Density:  There are scattered areas of fibroglandular density.    Partial mastectomy and radiation therapy changes are noted in the  right breast. No suspicious masses or calcifications are identified.    Impression  No mammographic evidence of malignancy.    BI-RADS CATEGORY:  BI-RADS Category:  2 Benign.  Recommendation:  Annual Screening.  Recommended Date:  1 Year.  Laterality:  Bilateral.        For any future breast imaging appointments, please call [phone]  ([phone]).      MACRO:  None    Signed by: Paras Vides 1/10/2025 4:44 PM  Dictation workstation:   [id number]      I explained the results in depth, along with suggested explanation for follow up recommendations based on the testing results. BI-RADS Category  2    Orders  Orders Placed This Encounter   Procedures    BI mammo bilateral diagnostic tomosynthesis     Due June 13 2024     Standing Status:   Future     Standing Expiration Date:   2/10/2026     Order Specific Question:   Reason for exam:     Answer:   history right breast cancer     Order Specific Question:   Radiologist to Determine Optimal Study     Answer:   Yes     Order Specific Question:   Release result to [facility]     Answer:   Immediate [1]     Order Specific Question:   Is this exam part of a Research Study? If Yes, link this order to the research study     Answer:   No     Order Specific Question:   Is the patient pregnant?     Answer:   No       Visit Diagnosis  1. Encounter for follow-up surveillance of breast cancer  Clinic Appointment Request Follow Up    BI mammo bilateral diagnostic tomosynthesis          Assessment/Plan  Breast cancer surveillance, normal clinical exam and imaging, history right breast cancer, lumpectomy and radiation, on Tamoxifen, scattered fibroglandular tissue    Plan:  Return January 2026 for bilateral diagnostic mammogram and office visit. Continue Tamoxifen.    Patient Discussion/Summary  Your clinical examination and imaging are normal. Please return in one year for mammogram and office visit or sooner if you have any problems or concerns.     You can see your health information, review clinical summaries from office visits & test results online when you follow your health with MY  Chart, a personal health record. To sign up go to [email or web address]. If you need assistance with signing up or trouble getting into your account call [facility] Patient Line 24/7 at [phone].    My office phone number is [phone] if you need to get in touch with me or have additional questions or concerns. Thank you for choosing [facility] and trusting me as your healthcare provider. I look forward to seeing you again at your next office visit. I am honored to be a  provider on your health care team and I remain dedicated to helping you achieve your health goals.    Reshma Manrique, APRN-CNP

## 2025-03-11 DIAGNOSIS — R74.8 ELEVATED LIVER ENZYMES: Primary | ICD-10-CM

## 2025-03-18 ENCOUNTER — APPOINTMENT (OUTPATIENT)
Dept: PRIMARY CARE | Facility: CLINIC | Age: 60
End: 2025-03-18
Payer: COMMERCIAL

## 2025-03-18 VITALS
DIASTOLIC BLOOD PRESSURE: 80 MMHG | SYSTOLIC BLOOD PRESSURE: 118 MMHG | BODY MASS INDEX: 44.24 KG/M2 | HEIGHT: 65 IN | HEART RATE: 67 BPM | TEMPERATURE: 98 F | WEIGHT: 265.5 LBS

## 2025-03-18 DIAGNOSIS — G47.33 OSA (OBSTRUCTIVE SLEEP APNEA): ICD-10-CM

## 2025-03-18 DIAGNOSIS — E55.9 VITAMIN D DEFICIENCY: ICD-10-CM

## 2025-03-18 DIAGNOSIS — Z00.00 WELL ADULT HEALTH CHECK: ICD-10-CM

## 2025-03-18 DIAGNOSIS — E66.01 CLASS 3 SEVERE OBESITY DUE TO EXCESS CALORIES WITHOUT SERIOUS COMORBIDITY WITH BODY MASS INDEX (BMI) OF 50.0 TO 59.9 IN ADULT: Primary | ICD-10-CM

## 2025-03-18 DIAGNOSIS — E66.813 CLASS 3 SEVERE OBESITY DUE TO EXCESS CALORIES WITHOUT SERIOUS COMORBIDITY WITH BODY MASS INDEX (BMI) OF 50.0 TO 59.9 IN ADULT: Primary | ICD-10-CM

## 2025-03-18 PROCEDURE — 3008F BODY MASS INDEX DOCD: CPT | Performed by: FAMILY MEDICINE

## 2025-03-18 PROCEDURE — 99213 OFFICE O/P EST LOW 20 MIN: CPT | Performed by: FAMILY MEDICINE

## 2025-03-18 PROCEDURE — 3074F SYST BP LT 130 MM HG: CPT | Performed by: FAMILY MEDICINE

## 2025-03-18 PROCEDURE — 3079F DIAST BP 80-89 MM HG: CPT | Performed by: FAMILY MEDICINE

## 2025-03-18 RX ORDER — ERGOCALCIFEROL 1.25 MG/1
50000 CAPSULE ORAL
Qty: 4 CAPSULE | Refills: 3 | Status: SHIPPED | OUTPATIENT
Start: 2025-03-18

## 2025-03-18 RX ORDER — CLOBETASOL PROPIONATE 0.5 MG/G
CREAM TOPICAL
COMMUNITY
Start: 2025-01-22

## 2025-03-18 RX ORDER — KETOCONAZOLE 20 MG/ML
SHAMPOO, SUSPENSION TOPICAL
COMMUNITY
Start: 2025-01-22

## 2025-03-18 NOTE — ASSESSMENT & PLAN NOTE
She takes this vitamin D monthly  Orders:    ergocalciferol (Vitamin D-2) 1250 mcg (50,000 units) capsule; Take 1 capsule (50,000 Units) by mouth every 30 (thirty) days.    Vitamin D 25-Hydroxy,Total (for eval of Vitamin D levels); Future

## 2025-03-18 NOTE — ASSESSMENT & PLAN NOTE
Doing great on the semaglutide.  Continue same.  We talked about diet and exercise.   Orders:    Follow Up In [facility] - PCP - Established

## 2025-03-18 NOTE — PROGRESS NOTES
"Subjective   Melissa Bardales is a 59 y.o. female who presents for Follow-up (3mon follow up).    Melissa is doing great on the semaglutide.  Has lost over 70# since starting.  Watching her diet carefully.  Walking and caring for her grandkids frequently.    Just stepped up to the 1.8 and hasn't started it yet.  Her weight loss seemed to be slowing down so she wanted to increase dose.  Tolerated the 1.2 very well with no GI symptoms at all.  Using her PAP therapy but it is giving her a message that it is not triggering any more.  (It isn't ramping up)         Review of Systems    Objective   /80   Pulse 67   Temp 36.7 °C (98 °F)   Ht 1.651 m (5' 5\")   Wt 120 kg (265 lb 8 oz)   BMI 44.18 kg/m²     Physical Exam    Assessment/Plan   Assessment & Plan  Class 3 severe obesity due to excess calories without serious comorbidity with body mass index (BMI) of 50.0 to 59.9 in adult  Doing great on the semaglutide.  Continue same.  We talked about diet and exercise.   Orders:    Follow Up In [facility] - PCP - Established    Vitamin D deficiency  She takes this vitamin D monthly  Orders:    ergocalciferol (Vitamin D-2) 1250 mcg (50,000 units) capsule; Take 1 capsule (50,000 Units) by mouth every 30 (thirty) days.    Vitamin D 25-Hydroxy,Total (for eval of Vitamin D levels); Future    ELIZABETH (obstructive sleep apnea)  Please send a photo of the PAP machine so I can see what you are talking about.         Well adult health check  Talk about colonoscopy next visit.    Orders:    Follow Up In [facility] - PCP - Health Maintenance; Future           There are no Patient Instructions on file for this visit.   "

## 2025-03-18 NOTE — ASSESSMENT & PLAN NOTE
Talk about colonoscopy next visit.    Orders:    Follow Up In [facility] - PCP - Health Maintenance; Future

## 2025-03-19 LAB
25(OH)D3+25(OH)D2 SERPL-MCNC: 49 NG/ML (ref 30–100)
ALBUMIN SERPL-MCNC: 4.1 G/DL (ref 3.6–5.1)
ALP SERPL-CCNC: 64 U/L (ref 37–153)
ALT SERPL-CCNC: 55 U/L (ref 6–29)
ANION GAP SERPL CALCULATED.4IONS-SCNC: 9 MMOL/L (CALC) (ref 7–17)
AST SERPL-CCNC: 60 U/L (ref 10–35)
BASOPHILS # BLD AUTO: 70 CELLS/UL (ref 0–200)
BASOPHILS NFR BLD AUTO: 0.8 %
BILIRUB SERPL-MCNC: 0.8 MG/DL (ref 0.2–1.2)
BUN SERPL-MCNC: 11 MG/DL (ref 7–25)
CALCIUM SERPL-MCNC: 9.5 MG/DL (ref 8.6–10.4)
CHLORIDE SERPL-SCNC: 102 MMOL/L (ref 98–110)
CO2 SERPL-SCNC: 27 MMOL/L (ref 20–32)
CREAT SERPL-MCNC: 0.68 MG/DL (ref 0.5–1.03)
EGFRCR SERPLBLD CKD-EPI 2021: 100 ML/MIN/1.73M2
EOSINOPHIL # BLD AUTO: 211 CELLS/UL (ref 15–500)
EOSINOPHIL NFR BLD AUTO: 2.4 %
ERYTHROCYTE [DISTWIDTH] IN BLOOD BY AUTOMATED COUNT: 12.2 % (ref 11–15)
GLUCOSE SERPL-MCNC: 94 MG/DL (ref 65–139)
HCT VFR BLD AUTO: 44.8 % (ref 35–45)
HGB BLD-MCNC: 14.9 G/DL (ref 11.7–15.5)
LYMPHOCYTES # BLD AUTO: 2156 CELLS/UL (ref 850–3900)
LYMPHOCYTES NFR BLD AUTO: 24.5 %
MCH RBC QN AUTO: 31.4 PG (ref 27–33)
MCHC RBC AUTO-ENTMCNC: 33.3 G/DL (ref 32–36)
MCV RBC AUTO: 94.5 FL (ref 80–100)
MONOCYTES # BLD AUTO: 598 CELLS/UL (ref 200–950)
MONOCYTES NFR BLD AUTO: 6.8 %
NEUTROPHILS # BLD AUTO: 5764 CELLS/UL (ref 1500–7800)
NEUTROPHILS NFR BLD AUTO: 65.5 %
PLATELET # BLD AUTO: 239 THOUSAND/UL (ref 140–400)
PMV BLD REES-ECKER: 10.5 FL (ref 7.5–12.5)
POTASSIUM SERPL-SCNC: 4.3 MMOL/L (ref 3.5–5.3)
PROT SERPL-MCNC: 7 G/DL (ref 6.1–8.1)
RBC # BLD AUTO: 4.74 MILLION/UL (ref 3.8–5.1)
SODIUM SERPL-SCNC: 138 MMOL/L (ref 135–146)
WBC # BLD AUTO: 8.8 THOUSAND/UL (ref 3.8–10.8)

## 2025-03-21 DIAGNOSIS — C50.211 MALIGNANT NEOPLASM OF UPPER-INNER QUADRANT OF RIGHT BREAST IN FEMALE, ESTROGEN RECEPTOR POSITIVE: ICD-10-CM

## 2025-03-21 DIAGNOSIS — Z17.0 MALIGNANT NEOPLASM OF UPPER-INNER QUADRANT OF RIGHT BREAST IN FEMALE, ESTROGEN RECEPTOR POSITIVE: ICD-10-CM

## 2025-03-21 RX ORDER — TAMOXIFEN CITRATE 20 MG/1
20 TABLET ORAL DAILY
Qty: 90 TABLET | Refills: 3 | Status: SHIPPED | OUTPATIENT
Start: 2025-03-21

## 2025-03-24 DIAGNOSIS — G47.33 OSA (OBSTRUCTIVE SLEEP APNEA): Primary | ICD-10-CM

## 2025-03-30 ENCOUNTER — PROCEDURE VISIT (OUTPATIENT)
Dept: SLEEP MEDICINE | Facility: HOSPITAL | Age: 60
End: 2025-03-30
Payer: COMMERCIAL

## 2025-03-30 DIAGNOSIS — G47.33 OSA (OBSTRUCTIVE SLEEP APNEA): ICD-10-CM

## 2025-03-30 PROCEDURE — 95806 SLEEP STUDY UNATT&RESP EFFT: CPT | Performed by: STUDENT IN AN ORGANIZED HEALTH CARE EDUCATION/TRAINING PROGRAM

## 2025-05-19 DIAGNOSIS — Z17.0 MALIGNANT NEOPLASM OF UPPER-INNER QUADRANT OF RIGHT BREAST IN FEMALE, ESTROGEN RECEPTOR POSITIVE: ICD-10-CM

## 2025-05-19 DIAGNOSIS — C50.211 MALIGNANT NEOPLASM OF UPPER-INNER QUADRANT OF RIGHT BREAST IN FEMALE, ESTROGEN RECEPTOR POSITIVE: ICD-10-CM

## 2025-05-19 RX ORDER — ALENDRONATE SODIUM 70 MG/1
70 TABLET ORAL
Qty: 12 TABLET | Refills: 2 | Status: SHIPPED | OUTPATIENT
Start: 2025-05-19

## 2025-05-20 DIAGNOSIS — I10 PRIMARY HYPERTENSION: ICD-10-CM

## 2025-05-20 RX ORDER — LOSARTAN POTASSIUM 50 MG/1
50 TABLET ORAL DAILY
Qty: 90 TABLET | Refills: 3 | Status: SHIPPED | OUTPATIENT
Start: 2025-05-20

## 2025-07-01 ENCOUNTER — APPOINTMENT (OUTPATIENT)
Dept: PRIMARY CARE | Facility: CLINIC | Age: 60
End: 2025-07-01
Payer: COMMERCIAL

## 2025-07-01 VITALS
BODY MASS INDEX: 43.36 KG/M2 | HEIGHT: 65 IN | WEIGHT: 260.25 LBS | SYSTOLIC BLOOD PRESSURE: 124 MMHG | HEART RATE: 84 BPM | TEMPERATURE: 97.9 F | DIASTOLIC BLOOD PRESSURE: 86 MMHG

## 2025-07-01 DIAGNOSIS — G47.33 OSA (OBSTRUCTIVE SLEEP APNEA): ICD-10-CM

## 2025-07-01 DIAGNOSIS — E66.813 CLASS 3 SEVERE OBESITY DUE TO EXCESS CALORIES WITHOUT SERIOUS COMORBIDITY WITH BODY MASS INDEX (BMI) OF 40.0 TO 44.9 IN ADULT: ICD-10-CM

## 2025-07-01 DIAGNOSIS — Z00.00 WELL ADULT HEALTH CHECK: Primary | ICD-10-CM

## 2025-07-01 DIAGNOSIS — F32.5 DEPRESSION, MAJOR, IN REMISSION: ICD-10-CM

## 2025-07-01 DIAGNOSIS — I10 PRIMARY HYPERTENSION: ICD-10-CM

## 2025-07-01 DIAGNOSIS — M79.89 MASS OF SOFT TISSUE OF RIGHT LOWER EXTREMITY: ICD-10-CM

## 2025-07-01 DIAGNOSIS — F41.1 GENERALIZED ANXIETY DISORDER: ICD-10-CM

## 2025-07-01 DIAGNOSIS — S39.012A STRAIN OF LUMBAR REGION, INITIAL ENCOUNTER: ICD-10-CM

## 2025-07-01 DIAGNOSIS — K76.0 NON-ALCOHOLIC FATTY LIVER DISEASE: ICD-10-CM

## 2025-07-01 PROBLEM — G47.19 EXCESSIVE DAYTIME SLEEPINESS: Status: RESOLVED | Noted: 2023-08-08 | Resolved: 2025-07-01

## 2025-07-01 PROCEDURE — 3079F DIAST BP 80-89 MM HG: CPT | Performed by: FAMILY MEDICINE

## 2025-07-01 PROCEDURE — 99214 OFFICE O/P EST MOD 30 MIN: CPT | Performed by: FAMILY MEDICINE

## 2025-07-01 PROCEDURE — 3074F SYST BP LT 130 MM HG: CPT | Performed by: FAMILY MEDICINE

## 2025-07-01 PROCEDURE — 99396 PREV VISIT EST AGE 40-64: CPT | Performed by: FAMILY MEDICINE

## 2025-07-01 PROCEDURE — 1036F TOBACCO NON-USER: CPT | Performed by: FAMILY MEDICINE

## 2025-07-01 PROCEDURE — 3008F BODY MASS INDEX DOCD: CPT | Performed by: FAMILY MEDICINE

## 2025-07-01 RX ORDER — CYCLOBENZAPRINE HCL 5 MG
5 TABLET ORAL NIGHTLY PRN
Qty: 20 TABLET | Refills: 0 | Status: SHIPPED | OUTPATIENT
Start: 2025-07-01 | End: 2025-08-30

## 2025-07-01 RX ORDER — ATENOLOL 100 MG/1
100 TABLET ORAL NIGHTLY
Qty: 90 TABLET | Refills: 3 | Status: SHIPPED | OUTPATIENT
Start: 2025-07-01

## 2025-07-01 RX ORDER — VENLAFAXINE HYDROCHLORIDE 37.5 MG/1
37.5 CAPSULE, EXTENDED RELEASE ORAL DAILY
Qty: 90 CAPSULE | Refills: 3 | Status: SHIPPED | OUTPATIENT
Start: 2025-07-01 | End: 2026-06-26

## 2025-07-01 RX ORDER — LOSARTAN POTASSIUM 50 MG/1
50 TABLET ORAL DAILY
Qty: 90 TABLET | Refills: 3 | Status: SHIPPED | OUTPATIENT
Start: 2025-07-01

## 2025-07-01 ASSESSMENT — PATIENT HEALTH QUESTIONNAIRE - PHQ9
2. FEELING DOWN, DEPRESSED OR HOPELESS: SEVERAL DAYS
SUM OF ALL RESPONSES TO PHQ9 QUESTIONS 1 AND 2: 2
1. LITTLE INTEREST OR PLEASURE IN DOING THINGS: SEVERAL DAYS

## 2025-07-01 NOTE — ASSESSMENT & PLAN NOTE
Chart reviewed in full.  Health maintenance and immunizations up to date except as noted.  We discussed healthy dietary habits and regular exercise including aerobic and strength training.    Orders:    Follow Up In [facility] - PCP - Health Maintenance    Referral to Gastroenterology; Future

## 2025-07-01 NOTE — ASSESSMENT & PLAN NOTE
Doing fine right now uses Flexeril periodically  Orders:    cyclobenzaprine (Flexeril) 5 mg tablet; Take 1 tablet (5 mg) by mouth as needed at bedtime for muscle spasms.

## 2025-07-01 NOTE — ASSESSMENT & PLAN NOTE
Will plan to increase semaglutide to 2.2.  we talked about tweaking diet to omit some foods that are still no good.  Also, try to do more exercise.  Specifically weight training.    Orders:    Follow Up In [facility] - PCP - Established; Future

## 2025-07-01 NOTE — PROGRESS NOTES
"Subjective   Melissa Bardales is a 60 y.o. female who presents for Annual Exam.    Melissa is here for physical.  She is without complaints today.  She has been using the compounded semaglutide with great success but finds that in the last few months her weight loss has decreased.  She is currently on the 1.8 mg dosing.  She denies any GI complaints with this medication.  She does not do specific exercise but cares for her young grandchildren 3 times a week and also works in the garden quite a bit.  She is trying to watch her diet    We stopped venlafaxine last year and she feels like she is struggling with depression lately and wants to restart it.  Denies SI HI.  Finds herself worrying a lot and poorly motivated    Also has noticed a lump on her anterior right proximal shin that has been there for a while.  It is nontender         Review of Systems    Objective   /86   Pulse 84   Temp 36.6 °C (97.9 °F) (Temporal)   Ht 1.651 m (5' 5\")   Wt 118 kg (260 lb 4 oz)   BMI 43.31 kg/m²     Physical Exam  HENT:      Head: Normocephalic and atraumatic.      Mouth/Throat:      Mouth: Mucous membranes are moist.      Pharynx: Oropharynx is clear.   Eyes:      Extraocular Movements: Extraocular movements intact.      Pupils: Pupils are equal, round, and reactive to light.   Cardiovascular:      Rate and Rhythm: Normal rate and regular rhythm.      Heart sounds: Normal heart sounds.   Pulmonary:      Effort: Pulmonary effort is normal.      Breath sounds: Normal breath sounds.   Musculoskeletal:         General: Normal range of motion.      Cervical back: Normal range of motion.        Legs:       Comments: Nontender soft tissue mass in the area above   Lymphadenopathy:      Cervical: No cervical adenopathy.   Skin:     General: Skin is warm and dry.   Neurological:      General: No focal deficit present.      Mental Status: She is alert.   Psychiatric:         Mood and Affect: Mood normal.         Assessment/Plan "   Assessment & Plan  Well adult health check  Chart reviewed in full.  Health maintenance and immunizations up to date except as noted.  We discussed healthy dietary habits and regular exercise including aerobic and strength training.    Orders:    Follow Up In [facility] - PCP - Health Maintenance    Referral to Gastroenterology; Future    Primary hypertension  Good control continue same  Orders:    atenolol (Tenormin) 100 mg tablet; Take 1 tablet (100 mg) by mouth once daily at bedtime.    losartan (Cozaar) 50 mg tablet; Take 1 tablet (50 mg) by mouth once daily.    Strain of lumbar region, initial encounter  Doing fine right now uses Flexeril periodically  Orders:    cyclobenzaprine (Flexeril) 5 mg tablet; Take 1 tablet (5 mg) by mouth as needed at bedtime for muscle spasms.    Depression, major, in remission  Will restart venlafaxine.  Supportive counseling       Generalized anxiety disorder    Orders:    venlafaxine XR (Effexor-XR) 37.5 mg 24 hr capsule; Take 1 capsule (37.5 mg) by mouth once daily. Take with food.    ELIZABETH (obstructive sleep apnea)  Uses CPAP nightly.         Class 3 severe obesity due to excess calories without serious comorbidity with body mass index (BMI) of 40.0 to 44.9 in adult  Will plan to increase semaglutide to 2.2.  we talked about tweaking diet to omit some foods that are still no good.  Also, try to do more exercise.  Specifically weight training.    Orders:    Follow Up In [facility] - PCP - Established; Future    Non-alcoholic fatty liver disease         Mass of soft tissue of right lower extremity  We will get an ultrasound to look at the Texture of this mass.  Will continue to monitor for now.  If it continues to grow she may need a biopsy  Orders:    US head neck soft tissue; Future           There are no Patient Instructions on file for this visit.

## 2025-07-01 NOTE — ASSESSMENT & PLAN NOTE
Good control continue same  Orders:    atenolol (Tenormin) 100 mg tablet; Take 1 tablet (100 mg) by mouth once daily at bedtime.    losartan (Cozaar) 50 mg tablet; Take 1 tablet (50 mg) by mouth once daily.

## 2025-07-09 ENCOUNTER — LAB (OUTPATIENT)
Dept: LAB | Facility: HOSPITAL | Age: 60
End: 2025-07-09
Payer: COMMERCIAL

## 2025-07-09 DIAGNOSIS — C50.211 MALIGNANT NEOPLASM OF UPPER-INNER QUADRANT OF RIGHT BREAST IN FEMALE, ESTROGEN RECEPTOR POSITIVE: ICD-10-CM

## 2025-07-09 DIAGNOSIS — Z17.0 MALIGNANT NEOPLASM OF UPPER-INNER QUADRANT OF RIGHT BREAST IN FEMALE, ESTROGEN RECEPTOR POSITIVE: ICD-10-CM

## 2025-07-09 PROCEDURE — 86300 IMMUNOASSAY TUMOR CA 15-3: CPT

## 2025-07-10 LAB — CANCER AG27-29 SERPL-ACNC: 17.6 U/ML (ref 0–38.6)

## 2025-07-14 NOTE — PROGRESS NOTES
Patient ID: Melissa Bardales is a 60 y.o. female.    Subjective    HPI  MsSheldon Bardales is a 60 y.o. female who presents for follow-up for early breast cancer, currently on Tamoxifen.     She has tolerating Tamoxifen well with no major issues. She denies hot flashes, mood swings. She also continues to take alendronate.       Patient's past medical history, surgical history, family history and social history reviewed.    Review of Systems:   Review of Systems:    Positive per HPI, otherwise negative.       Objective    Vitals:    07/15/25 1113   BP: 148/88   Pulse: 67   Resp: 18   Temp: 36.9 °C (98.4 °F)   SpO2: 94%         Physical Exam  Gen: appears well in clinic, NAD  HEENT: atraumatic head, normocephalic, EOMI, conjunctiva normal  LUNG: no increased WOB, CTAB  CV: No JVD. RRR  GI: soft, NT, ND  LE: no LE edema  Skin: no obvious rashes or lesions on visible skin  Neuro: interactive, no focal deficits noted  Psych: normal mood and affect    Performance Status:  Symptomatic; fully ambulatory    Labs/Imaging/Pathology: personally reviewed reports and images in Epic electronic medical record system. Pertinent results as it related to the plan represented in below in assessment and plan.     Assessment/Plan   1. Stage IA right breast IDC, [id number] (1.6 cm) ER 95,VT 95, HER2 negative, low risk luminal type A  - Initially diagnosed after screening mammogram. Patient is now s/p right partial mastectomy.  - We discussed her final pathology, low risk luminal type A, and given no negative small tumor, will proceed with adjuvant endocrine therapy after radiation therapy.  - Discussed data on risk of recurrence, and given young age, I do recommend endocrine therapy, although she is concerned for side effects.  - Discussed risk of bone loss and advise to start calcium and vitamin D.  - Also discussed long term risk of cardiac disease and discussed importance of maintaining a healthy BMI and following up for regular age  appropriate cardiovascular health such as good blood pressure control and assessing for HLD.  - Started letrozole 2.5 mg pO 3/31/2021.  - Patient switched to Arimidex (6/10/2021) due to possible dizziness from letrozole.  - We did discuss Zometa as an adjuvant treatment to help prevent bone loss and recurrence of breast cancer however declined  - 7/2023 started alendronate weekly for osteopenia    7/15/24:  - Patient does have some increased liver enzymes because of history of fatty liver.   - At this point, we will check an ultrasound of her right upper quadrant and we'll place a referral   - She has been losing weight   - Will continue tamoxifen   - Will plan mammogram in January with follow-up with breast team   - We will plan for follow-up 6 months later   - Will plan for cancer index to review at her next visit   - RTC in January.     7/15/25:   - No evidence of recurrence on exam today   - She will be 5 years out in March 2026   - She will get a mammogram in January and follow up with us in July   - Will plan for bone density prior   - Continue Tamoxifen, refill provided   - RTC with me in 1 year       2. Depression  - On Wellbutrin for the last  year.  - Will monitor while on Letrozole.   - Doing ok.     3. Hypertension   - Management per PCP.     4. Vertigo  - Appears benign positional. Occurs daily. No recent viral or other provoking factors. Patient to try Epley maneuvers and follow up in 2 weeks.     Reviewed ongoing medical problems and how they relate to her breast cancer, will continue long term monitoring.    RTC in 1 year  This note has been transcribed using a medical scribe and there is a possibility of unintentional typing misprints    Diagnoses and all orders for this visit:  Menopause  -     XR DEXA bone density; Future  Malignant neoplasm of upper-inner quadrant of right breast in female, estrogen receptor positive  -     Clinic Appointment Request  -     CBC and Auto Differential; Future  -      Comprehensive Metabolic Panel; Future  -     Clinic Appointment Request; Future  -     CBC and Auto Differential; Future  -     Comprehensive metabolic panel; Future  -     Cancer Antigen 27-29; Future      Rhonda Quiroz MD  Hematology/Oncology  [facility]      Scribe Attestation  By signing my name below, Gisela PATEL Scribe, attest that this documentation has been prepared under the direction and in the presence of Rhonda Quiroz MD.    Provider Attestation  Rhonda PATEL MD, personally performed the services described in the documentation as scribed by Gisela Mclain, in my presence, and confirm it is both accurate and complete.

## 2025-07-15 ENCOUNTER — LAB (OUTPATIENT)
Dept: LAB | Facility: CLINIC | Age: 60
End: 2025-07-15
Payer: COMMERCIAL

## 2025-07-15 ENCOUNTER — OFFICE VISIT (OUTPATIENT)
Dept: HEMATOLOGY/ONCOLOGY | Facility: CLINIC | Age: 60
End: 2025-07-15
Payer: COMMERCIAL

## 2025-07-15 ENCOUNTER — PATIENT MESSAGE (OUTPATIENT)
Dept: GASTROENTEROLOGY | Facility: HOSPITAL | Age: 60
End: 2025-07-15

## 2025-07-15 VITALS
DIASTOLIC BLOOD PRESSURE: 88 MMHG | BODY MASS INDEX: 42.7 KG/M2 | HEART RATE: 67 BPM | TEMPERATURE: 98.4 F | SYSTOLIC BLOOD PRESSURE: 148 MMHG | WEIGHT: 256.62 LBS | OXYGEN SATURATION: 94 % | RESPIRATION RATE: 18 BRPM

## 2025-07-15 DIAGNOSIS — Z17.0 MALIGNANT NEOPLASM OF UPPER-INNER QUADRANT OF RIGHT BREAST IN FEMALE, ESTROGEN RECEPTOR POSITIVE: ICD-10-CM

## 2025-07-15 DIAGNOSIS — Z78.0 MENOPAUSE: Primary | ICD-10-CM

## 2025-07-15 DIAGNOSIS — C50.211 MALIGNANT NEOPLASM OF UPPER-INNER QUADRANT OF RIGHT BREAST IN FEMALE, ESTROGEN RECEPTOR POSITIVE: ICD-10-CM

## 2025-07-15 DIAGNOSIS — R74.8 ELEVATED LIVER ENZYMES: ICD-10-CM

## 2025-07-15 DIAGNOSIS — K76.0 NON-ALCOHOLIC FATTY LIVER DISEASE: Primary | ICD-10-CM

## 2025-07-15 LAB
ALBUMIN SERPL BCP-MCNC: 4 G/DL (ref 3.4–5)
ALP SERPL-CCNC: 69 U/L (ref 33–136)
ALT SERPL W P-5'-P-CCNC: 61 U/L (ref 7–45)
ANION GAP SERPL CALC-SCNC: 13 MMOL/L (ref 10–20)
AST SERPL W P-5'-P-CCNC: 52 U/L (ref 9–39)
BASOPHILS # BLD AUTO: 0.04 X10*3/UL (ref 0–0.1)
BASOPHILS NFR BLD AUTO: 0.5 %
BILIRUB SERPL-MCNC: 0.7 MG/DL (ref 0–1.2)
BUN SERPL-MCNC: 12 MG/DL (ref 6–23)
CALCIUM SERPL-MCNC: 9.5 MG/DL (ref 8.6–10.3)
CHLORIDE SERPL-SCNC: 104 MMOL/L (ref 98–107)
CO2 SERPL-SCNC: 26 MMOL/L (ref 21–32)
CREAT SERPL-MCNC: 0.68 MG/DL (ref 0.5–1.05)
EGFRCR SERPLBLD CKD-EPI 2021: >90 ML/MIN/1.73M*2
EOSINOPHIL # BLD AUTO: 0.16 X10*3/UL (ref 0–0.7)
EOSINOPHIL NFR BLD AUTO: 2.2 %
ERYTHROCYTE [DISTWIDTH] IN BLOOD BY AUTOMATED COUNT: 12.2 % (ref 11.5–14.5)
GLUCOSE SERPL-MCNC: 90 MG/DL (ref 74–99)
HCT VFR BLD AUTO: 43.9 % (ref 36–46)
HGB BLD-MCNC: 14.5 G/DL (ref 12–16)
IMM GRANULOCYTES # BLD AUTO: 0.01 X10*3/UL (ref 0–0.7)
IMM GRANULOCYTES NFR BLD AUTO: 0.1 % (ref 0–0.9)
LYMPHOCYTES # BLD AUTO: 1.56 X10*3/UL (ref 1.2–4.8)
LYMPHOCYTES NFR BLD AUTO: 21.2 %
MCH RBC QN AUTO: 30.8 PG (ref 26–34)
MCHC RBC AUTO-ENTMCNC: 33 G/DL (ref 32–36)
MCV RBC AUTO: 93 FL (ref 80–100)
MONOCYTES # BLD AUTO: 0.54 X10*3/UL (ref 0.1–1)
MONOCYTES NFR BLD AUTO: 7.3 %
NEUTROPHILS # BLD AUTO: 5.04 X10*3/UL (ref 1.2–7.7)
NEUTROPHILS NFR BLD AUTO: 68.7 %
PLATELET # BLD AUTO: 183 X10*3/UL (ref 150–450)
POTASSIUM SERPL-SCNC: 4 MMOL/L (ref 3.5–5.3)
PROT SERPL-MCNC: 6.8 G/DL (ref 6.4–8.2)
RBC # BLD AUTO: 4.71 X10*6/UL (ref 4–5.2)
SODIUM SERPL-SCNC: 139 MMOL/L (ref 136–145)
WBC # BLD AUTO: 7.4 X10*3/UL (ref 4.4–11.3)

## 2025-07-15 PROCEDURE — 36415 COLL VENOUS BLD VENIPUNCTURE: CPT

## 2025-07-15 PROCEDURE — 3079F DIAST BP 80-89 MM HG: CPT | Performed by: INTERNAL MEDICINE

## 2025-07-15 PROCEDURE — 85025 COMPLETE CBC W/AUTO DIFF WBC: CPT

## 2025-07-15 PROCEDURE — 99214 OFFICE O/P EST MOD 30 MIN: CPT | Performed by: INTERNAL MEDICINE

## 2025-07-15 PROCEDURE — 3077F SYST BP >= 140 MM HG: CPT | Performed by: INTERNAL MEDICINE

## 2025-07-15 PROCEDURE — 80053 COMPREHEN METABOLIC PANEL: CPT

## 2025-07-15 ASSESSMENT — PAIN SCALES - GENERAL: PAINLEVEL_OUTOF10: 0-NO PAIN

## 2025-07-16 LAB — HOLD SPECIMEN: NORMAL

## 2025-08-07 ENCOUNTER — HOSPITAL ENCOUNTER (OUTPATIENT)
Dept: RADIOLOGY | Facility: HOSPITAL | Age: 60
Discharge: HOME | End: 2025-08-07
Payer: COMMERCIAL

## 2025-08-07 DIAGNOSIS — M79.89 MASS OF SOFT TISSUE OF RIGHT LOWER EXTREMITY: ICD-10-CM

## 2025-08-07 PROCEDURE — 76882 US LMTD JT/FCL EVL NVASC XTR: CPT

## 2025-08-12 DIAGNOSIS — M79.89 MASS OF SOFT TISSUE OF RIGHT LOWER EXTREMITY: Primary | ICD-10-CM

## 2025-09-11 ENCOUNTER — APPOINTMENT (OUTPATIENT)
Dept: RADIOLOGY | Facility: CLINIC | Age: 60
End: 2025-09-11
Payer: COMMERCIAL

## 2025-11-04 ENCOUNTER — APPOINTMENT (OUTPATIENT)
Dept: PRIMARY CARE | Facility: CLINIC | Age: 60
End: 2025-11-04
Payer: COMMERCIAL

## 2026-01-16 ENCOUNTER — APPOINTMENT (OUTPATIENT)
Dept: SURGICAL ONCOLOGY | Facility: HOSPITAL | Age: 61
End: 2026-01-16
Payer: COMMERCIAL

## 2026-01-16 ENCOUNTER — APPOINTMENT (OUTPATIENT)
Dept: RADIOLOGY | Facility: HOSPITAL | Age: 61
End: 2026-01-16
Payer: COMMERCIAL

## 2026-05-06 ENCOUNTER — APPOINTMENT (OUTPATIENT)
Dept: RADIOLOGY | Facility: HOSPITAL | Age: 61
End: 2026-05-06
Payer: COMMERCIAL

## 2026-07-07 ENCOUNTER — APPOINTMENT (OUTPATIENT)
Dept: PRIMARY CARE | Facility: CLINIC | Age: 61
End: 2026-07-07
Payer: COMMERCIAL

## 2026-07-27 ENCOUNTER — APPOINTMENT (OUTPATIENT)
Dept: PRIMARY CARE | Facility: CLINIC | Age: 61
End: 2026-07-27
Payer: COMMERCIAL